# Patient Record
Sex: FEMALE | Race: WHITE | NOT HISPANIC OR LATINO | Employment: FULL TIME | URBAN - METROPOLITAN AREA
[De-identification: names, ages, dates, MRNs, and addresses within clinical notes are randomized per-mention and may not be internally consistent; named-entity substitution may affect disease eponyms.]

---

## 2017-05-23 ENCOUNTER — ANESTHESIA - HEALTHEAST (OUTPATIENT)
Dept: INTENSIVE CARE | Facility: HOSPITAL | Age: 42
End: 2017-05-23

## 2017-06-09 ENCOUNTER — OFFICE VISIT - HEALTHEAST (OUTPATIENT)
Dept: CARDIOLOGY | Facility: CLINIC | Age: 42
End: 2017-06-09

## 2017-06-09 DIAGNOSIS — G47.33 OSA ON CPAP: ICD-10-CM

## 2017-06-09 DIAGNOSIS — I48.0 PAROXYSMAL ATRIAL FIBRILLATION (H): ICD-10-CM

## 2017-06-09 ASSESSMENT — MIFFLIN-ST. JEOR: SCORE: 2116.46

## 2017-08-16 ENCOUNTER — OFFICE VISIT - HEALTHEAST (OUTPATIENT)
Dept: CARDIOLOGY | Facility: CLINIC | Age: 42
End: 2017-08-16

## 2017-08-16 DIAGNOSIS — I48.0 PAROXYSMAL ATRIAL FIBRILLATION (H): ICD-10-CM

## 2017-08-16 DIAGNOSIS — I10 ESSENTIAL HYPERTENSION WITH GOAL BLOOD PRESSURE LESS THAN 130/80: ICD-10-CM

## 2017-08-16 DIAGNOSIS — G47.33 OSA ON CPAP: ICD-10-CM

## 2017-08-16 ASSESSMENT — MIFFLIN-ST. JEOR: SCORE: 2138.12

## 2017-12-05 ENCOUNTER — COMMUNICATION - HEALTHEAST (OUTPATIENT)
Dept: SLEEP MEDICINE | Facility: CLINIC | Age: 42
End: 2017-12-05

## 2017-12-13 ENCOUNTER — OFFICE VISIT - HEALTHEAST (OUTPATIENT)
Dept: SLEEP MEDICINE | Facility: CLINIC | Age: 42
End: 2017-12-13

## 2017-12-13 DIAGNOSIS — G47.33 OSA ON CPAP: ICD-10-CM

## 2017-12-13 ASSESSMENT — MIFFLIN-ST. JEOR: SCORE: 2135.17

## 2017-12-14 ENCOUNTER — AMBULATORY - HEALTHEAST (OUTPATIENT)
Dept: SLEEP MEDICINE | Facility: CLINIC | Age: 42
End: 2017-12-14

## 2017-12-27 ENCOUNTER — AMBULATORY - HEALTHEAST (OUTPATIENT)
Dept: SLEEP MEDICINE | Facility: CLINIC | Age: 42
End: 2017-12-27

## 2018-08-08 ENCOUNTER — COMMUNICATION - HEALTHEAST (OUTPATIENT)
Dept: SLEEP MEDICINE | Facility: CLINIC | Age: 43
End: 2018-08-08

## 2019-01-24 ENCOUNTER — OFFICE VISIT - HEALTHEAST (OUTPATIENT)
Dept: FAMILY MEDICINE | Facility: CLINIC | Age: 44
End: 2019-01-24

## 2019-01-24 DIAGNOSIS — I48.0 PAROXYSMAL ATRIAL FIBRILLATION (H): ICD-10-CM

## 2019-01-28 ENCOUNTER — COMMUNICATION - HEALTHEAST (OUTPATIENT)
Dept: NURSING | Facility: CLINIC | Age: 44
End: 2019-01-28

## 2021-05-24 ENCOUNTER — RECORDS - HEALTHEAST (OUTPATIENT)
Dept: ADMINISTRATIVE | Facility: CLINIC | Age: 46
End: 2021-05-24

## 2021-05-25 ENCOUNTER — RECORDS - HEALTHEAST (OUTPATIENT)
Dept: ADMINISTRATIVE | Facility: CLINIC | Age: 46
End: 2021-05-25

## 2021-05-26 ENCOUNTER — RECORDS - HEALTHEAST (OUTPATIENT)
Dept: ADMINISTRATIVE | Facility: CLINIC | Age: 46
End: 2021-05-26

## 2021-05-27 ENCOUNTER — RECORDS - HEALTHEAST (OUTPATIENT)
Dept: ADMINISTRATIVE | Facility: CLINIC | Age: 46
End: 2021-05-27

## 2021-05-28 ENCOUNTER — RECORDS - HEALTHEAST (OUTPATIENT)
Dept: ADMINISTRATIVE | Facility: CLINIC | Age: 46
End: 2021-05-28

## 2021-05-29 ENCOUNTER — RECORDS - HEALTHEAST (OUTPATIENT)
Dept: ADMINISTRATIVE | Facility: CLINIC | Age: 46
End: 2021-05-29

## 2021-05-30 ENCOUNTER — RECORDS - HEALTHEAST (OUTPATIENT)
Dept: ADMINISTRATIVE | Facility: CLINIC | Age: 46
End: 2021-05-30

## 2021-05-31 VITALS — BODY MASS INDEX: 43.4 KG/M2 | HEIGHT: 69 IN | WEIGHT: 293 LBS

## 2021-05-31 VITALS — WEIGHT: 293 LBS | BODY MASS INDEX: 43.4 KG/M2 | HEIGHT: 69 IN

## 2021-05-31 VITALS — WEIGHT: 293 LBS | HEIGHT: 68 IN | BODY MASS INDEX: 44.41 KG/M2

## 2021-06-02 ENCOUNTER — RECORDS - HEALTHEAST (OUTPATIENT)
Dept: ADMINISTRATIVE | Facility: CLINIC | Age: 46
End: 2021-06-02

## 2021-06-02 VITALS — WEIGHT: 293 LBS | BODY MASS INDEX: 45.48 KG/M2

## 2021-06-10 NOTE — ANESTHESIA PREPROCEDURE EVALUATION
Anesthesia Evaluation      Patient summary reviewed   No history of anesthetic complications     Airway   Mallampati: II  Neck ROM: full   Pulmonary - normal exam   (+) sleep apnea,                          Cardiovascular   (+) dysrhythmias (Paroxysmal A-fib s/p several cardioversions), ,     ECG reviewed  Rhythm: irregular        Neuro/Psych    (+) depression, anxiety/panic attacks,     Endo/Other    (+) obesity (Morbid obesity),      GI/Hepatic/Renal - negative ROS      Other findings: 41 y.o. female PMH of paroxysmal atrial fibrillation, first paroxysm 2011, requiring cardioversion done, as well as on 4 other occasions, morbid obesity, depression and anxiety-noncompliant with her Wellbutrin and Prozac due to forgetting taking medications, transferred from  ER for:      Dental    (+) chipped                       Anesthesia Plan  Planned anesthetic: general mask and total IV anesthesia    ASA 3   Induction: intravenous   Anesthetic plan and risks discussed with: patient    Post-op plan: routine recovery

## 2021-06-10 NOTE — ANESTHESIA CARE TRANSFER NOTE
Last vitals: There were no vitals filed for this visit.  Patient's level of consciousness is drowsy  Spontaneous respirations: yes  Maintains airway independently: yes  Dentition unchanged: yes  Oropharynx: oropharynx clear of all foreign objects    QCDR Measures:  ASA# 20 - Surgical No Data Recorded  PQRS# 430 - Adult PONV Prevention: NA - Not adult patient, not GA or 3 or more risk factors NOT present  ASA# 8 - Peds PONV Prevention: NA - Not pediatric patient, not GA or 2 or more risk factors NOT present  PQRS# 424 - Judi-op Temp Management: [id number] - Body temp not recorded within timeframe  PQRS# 426 - PACU Transfer Protocol: - Transfer of care checklist used  ASA# 14 - Acute Post-op Pain: ASA14B - Patient did NOT experience pain >= 7 out of 10

## 2021-06-10 NOTE — ANESTHESIA POSTPROCEDURE EVALUATION
Patient: Milagro Thomas  * No procedures listed *  Anesthesia type: general    Patient location: [facility] Surgical Floor  Last vitals: There were no vitals filed for this visit.  Post vital signs: stable  Level of consciousness: awake and responds to simple questions  Post-anesthesia pain: pain controlled  Post-anesthesia nausea and vomiting: no  Pulmonary: unassisted, return to baseline  Cardiovascular: stable and blood pressure at baseline  Hydration: adequate  Anesthetic events: no    QCDR Measures:  ASA# 11 - Judi-op Cardiac Arrest: ASA11B - Patient did NOT experience unanticipated cardiac arrest  ASA# 12 - Judi-op Mortality Rate: ASA12B - Patient did NOT die  ASA# 13 - PACU Re-Intubation Rate: NA - No ETT / LMA used for case  ASA# 10 - Composite Anes Safety: ASA10A - No serious adverse event  ASA# 38 - New Corneal Injury: ASA38A - No new exposure keratitis or corneal abrasion in PACU    Additional Notes:  Patient was instructed to use her CPAP whenever sleeping at home post-cardioversion. That patient stated she will use her CPAP.    Bruno Marie MD

## 2021-06-11 NOTE — PROGRESS NOTES
[facility]   Arrhythmia Clinic    Assessment/Plan:  Diagnoses and all orders for this visit:    Paroxysmal atrial fibrillation first diagnosed in 2011 and having reoccurrence at least 6 months or more between episodes.  Had 4 cardioversions in the past.  She was awake with 1 of her cardioversions and is coming to clinic to find out what she could do to prevent having A. fib anymore.  She complains of swelling on diltiazem and fatigue on even low dose of beta-blocker of 25 mg on both atenolol and metoprolol.  I discussed since she has infrequent episodes she would need to do a nuclear stress test but could use flecainide as pill in the pocket if there is a delay in her going forward with ablation.  I discussed ablation as well.  I discussed normal anatomy and physiology of heart in sinus rhythm versus atrial fibrillation.  Discussed natural progression of atrial fibrillation but high individual variability and how this relates to success of PVI as well.  Discussed  pulmonary vein procedure in detail and answered multiple questions.  Discussed waiting list, purpose to get off antiarrhythmics but not necessarily anticoagulation if on it for high risk or other reasons and 30% chance of repeat procedure.  Discussed risk of procedure as but not limited to the following and <1% risk of each:  cardiac perforation, (tamponade),  embolic events like CVA or MI   death  phrenic nerve paralysis    I discussed need for  anticoagulation one month before in 3 months after.  She has IUD and still menstruating but later because of IUD.  Discussed her flow may be heavier during the time.  When she is on anticoagulation.  Would recommend Eliquis 5 mg 1 tab p.o. every 12 hours when anticoagulation needed.      After discussion, Milagro Thomas wants to meet with Dr. Warren and will bring her  to appointment as she is wanting ablation versus going on antiarrhythmic pill in the pocket.  Will try to set this up in 6-8  weeks.  [id number] score of 1 and recommended that she start on aspirin 325 mg 1 tab p.o. daily.    -     aspirin (ODESSA ASPIRIN) 325 MG tablet; Take 1 tablet (325 mg total) by mouth daily.; Refill: 0    CALLY on CPAP and cannot sleep without CPAP so she uses it consistently and not an issue for her.    Other orders  -     atenolol (TENORMIN) 25 MG tablet; Take 12.5-25 mg by mouth.      40 minutes were spent in face-to-face counseling regarding above diagnoses and options for treatment.      ______________________________________________________________________    Subjective:    I had the opportunity to see Milagro Thomas at the [facility]. Milagro Thomas is a 41 y.o. female and following up in clinic after being hospitalized with paroxysmal atrial fib and had afib with RVR of 110s-160s despite diltiazem drip.  She tells me that this last episode of atrial fib was more significant than it usually is for her regarding symptoms.  She had chest pain and doesn't usually have this.    She normally knows exactly when she goes out of rhythm because she feels it in her chest and is lightheaded.  She does not tolerate a lot of activity the longer she is in A. fib.  She was first diagnosed with atrial fib in 2011 and has had 4 cardioversions due to significant symptoms with A. fib episodes.  Her parents both have atrial fib and her father is in persistent A. fib all the time now.  She quit smoking 5 years ago.  She denies any history of hypertension.  She tells me that she is attempting to lose weight.  She denies any other significant medical history.  ______________________________________________________________________    Problem List:  Patient Active Problem List   Diagnosis     Morbid obesity with BMI of 40.0-44.9, adult     Major depressive disorder     Nonspecific chest pain     Anxiety disorder     Paroxysmal atrial fibrillation     CALLY on CPAP     Medical History:  Past Medical History:    Diagnosis Date     Anxiety      Eczema 2/10/2015     Morbid obesity with BMI of 40.0-44.9, adult      Obstructive sleep apnea syndrome 2013    Overview:  CPAP at 7 cm     Persistent atrial fibrillation 2011    S/P electrical cardioversion; had normal stress echo at [place] in . Four more cardioversions in ER since per patient. Did not tolerate metoprolol.     Surgical History:  Past Surgical History:   Procedure Laterality Date      SECTION, CLASSIC      x2     FOOT SURGERY      bone spur and reconstruction     MI ERCP W/BIOPSY SINGLE/MULTIPLE       Social History:  Social History   Substance Use Topics     Smoking status: Former Smoker     Types: Cigarettes     Smokeless tobacco: None     Alcohol use No        Review of Systems: Review of Systems:   General: WNL  Eyes: WNL  Ears/Nose/Throat: WNL  Lungs: WNL  Heart: WNL  Stomach: WNL  Bladder: WNL  Muscle/Joints: WNL  Skin: WNL  Nervous System: WNL  Mental Health: WNL     Blood: WNL      Family History:  Family History   Problem Relation Age of Onset     Diabetes Father      CABG Father 50     Atrial fibrillation Father      Sleep apnea Father      Skin cancer Father      Diabetes Maternal Aunt      No Medical Problems Mother      No Medical Problems Son          Allergies:  Allergies   Allergen Reactions     Morphine (Pf)      Heart rate slows and resp slow     Prednisone Palpitations     Medications:  Current Outpatient Prescriptions   Medication Sig Dispense Refill     buPROPion (WELLBUTRIN XL) 300 MG 24 hr tablet Take 300 mg by mouth daily.       clobetasol (TEMOVATE) 0.05 % cream Apply 1 application topically 2 (two) times a day as needed (eczema).        FLUoxetine (PROZAC) 40 MG capsule Take 40 mg by mouth daily.        hydrocortisone 1 % cream Apply 1 application topically 2 (two) times a day as needed (eczema).        ibuprofen (ADVIL,MOTRIN) 200 MG tablet Take 600 mg by mouth every 6 (six) hours as needed for pain.        "levonorgestrel (MIRENA) 20 mcg/24 hr (5 years) IUD 1 Device by Intrauterine route.       MELATONIN ORAL Take 6 mg by mouth at bedtime as needed. (2 x 3 mg chewable gummy = 6 mg dose)       naproxen (NAPROSYN) 250 MG tablet Take 250 mg by mouth 2 (two) times a day as needed.       aspirin (ODESSA ASPIRIN) 325 MG tablet Take 1 tablet (325 mg total) by mouth daily.  0     atenolol (TENORMIN) 25 MG tablet Take 12.5-25 mg by mouth.       diltiazem (CARDIZEM CD) 120 MG 24 hr capsule Take 1 capsule (120 mg total) by mouth daily. 30 capsule 0     No current facility-administered medications for this visit.      Facility-Administered Medications Ordered in Other Visits   Medication Dose Route Frequency Provider Last Rate Last Dose     naloxone injection 0.2-0.4 mg (NARCAN)  0.2-0.4 mg Intravenous PRN Bruno Marie MD        Or     naloxone injection 0.2-0.4 mg (NARCAN)  0.2-0.4 mg Intramuscular PRN Bruno Marie MD           Objective:   Vital signs:  /86 (Patient Site: Right Arm, Patient Position: Sitting, Cuff Size: Adult Large)  Pulse 80  Resp 18  Ht 5' 9\" (1.753 m)  Wt (!) 308 lb (139.7 kg)  SpO2 98%  BMI 45.48 kg/m2      Physical Exam:    GENERAL APPEARANCE: Alert, cooperative and in no acute distress.  HEENT: No scleral icterus. No Xanthelasma. Oral mucuos membranes pink and moist.  NECK: JVP Nl.   CHEST: clear to auscultation  CARDIOVASCULAR: S1, S2 without murmur ,clicks or rubs. Regular, regular.  Radial and posterior tibial pulses are intact and symetric. EXTREMITIES: No cyanosis, clubbing or edema.    Results personally reviewed:  Results for orders placed during the hospital encounter of 05/23/17   Echo Complete [ECH10] 05/23/2017    Narrative   Technically challenging study. Definity contrast utilized    Atrial fibrillation suggested with controlled ventricular response    Left ventricle ejection fraction is mildly, globally decreased. Left   ventricular ejection fraction estimated 45-50%. " Suboptimal endocardial   resolution. Cannot exclude more prominent lateral wall hypokinesis on this   examination    Moderate left ventricular hypertrophy suggested    The right ventricle and right atrium are not well visualized on this   examination    Left Atrium: Left atrial volume is mildly increased    No obvious significant valve abnormality    Consider additional modalities to better define left and right   ventricular systolic function          TSH:   Lab Results   Component Value Date    TSH 5.15 (H) 05/23/2017     BNP:   Lab Results   Component Value Date     (H) 05/23/2017     BMP:  Lab Results   Component Value Date    CREATININE 0.69 05/23/2017    BUN 8 05/23/2017     05/23/2017    K 3.8 05/23/2017     05/23/2017    CO2 25 05/23/2017       This note has been dictated using voice recognition software. Any grammatical or context distortions are unintentional and inherent to the software.    NAILA SEALS RN, [facility]  [phone]

## 2021-06-12 NOTE — PROGRESS NOTES
[facility]  Arrhythmia Clinic  Derejert HIPOLITO Warren    Referring:      Assessment:         Paroxysmal atrial fibrillation: The patient has a several year history of paroxysmal atrial fibrillation.  She has also required cardioversion on several occasions.  The patient has been on calcium channel blocker and beta-blocker and does not want to go on chronic medical therapy for her atrial fibrillation.  The patient is a candidate for early intervention of her atrial fibrillation and effort to decrease the likelihood of further recurrence in the future.  She does have risk factors including sleep apnea which is treated now and obesity which would increase the likelihood of recurrent A. fib as time goes by.  The risks benefits and expected outcomes for mapping and ablation of her pulmonary veins and additional non-PV triggers was discussed with the patient and her  in detail.  The patient will consider that information and render decision about moving forward in the next few days.    Hypertension: The patient's blood pressure is elevated today but apparently this is somewhat intermittent.  Weight loss and exercise would likely be of significant benefit for this patient.  In the meantime if she continues to have elevated blood pressure she will need antihypertensive medical therapy.    Obstructive sleep apnea: The patient is compliant with therapy.      Recommendations:    The patient will continue on her current medical regimen.    The patient will be contacted in the next few days as to whether or not she wishes to move forward with atrial fibrillation ablation/pulmonary vein isolation.  If the patient agrees to proceed then she would undergo routine preprocedural screening including CT scan of the chest to detail her left atrial and pulmonary vein anatomy.  She is also undergo LATRELL to rule out thrombus.    If the patient decides to proceed she will need to be placed on oral anticoagulant therapy  preprocedure per protocol.      Patient Active Problem List   Diagnosis     Morbid obesity with BMI of 40.0-44.9, adult     Major depressive disorder     Nonspecific chest pain     Anxiety disorder     Paroxysmal atrial fibrillation     CALLY on CPAP     Essential hypertension       Subjective:  Milagro Thomas (41 y.o. female) returns to the arrhythmia clinic for discussion of treatment options for her recurrent symptomatic atrial fibrillation.  The patient's atrial fibrillation dates back several years possibly to as early as 2005.  The patient's symptoms include palpitations, diaphoresis, lightheadedness and some mild shortness of breath on occasion.  The patient notes that she has had at least 5 cardioversions in the past as she is not on oral anticoagulant therapy and wishes to terminate her episodes in less than 24 hours.  The patient did have a bad experience with 1 of her cardioversions being fairly awake and being quite aware of the shock.  She reports no exertional dyspnea or chest pain.  She is not describing any orthopnea, PND, or ankle edema.    Current Outpatient Prescriptions   Medication Sig Dispense Refill     aspirin (ODESSA ASPIRIN) 325 MG tablet Take 1 tablet (325 mg total) by mouth daily.  0     atenolol (TENORMIN) 25 MG tablet Take 12.5-25 mg by mouth.       buPROPion (WELLBUTRIN XL) 300 MG 24 hr tablet Take 300 mg by mouth daily.       clobetasol (TEMOVATE) 0.05 % cream Apply 1 application topically 2 (two) times a day as needed (eczema).        diltiazem (CARDIZEM CD) 120 MG 24 hr capsule Take 1 capsule (120 mg total) by mouth daily. 30 capsule 0     FLUoxetine (PROZAC) 40 MG capsule Take 40 mg by mouth daily.        hydrocortisone 1 % cream Apply 1 application topically 2 (two) times a day as needed (eczema).        ibuprofen (ADVIL,MOTRIN) 200 MG tablet Take 600 mg by mouth every 6 (six) hours as needed for pain.       levonorgestrel (MIRENA) 20 mcg/24 hr (5 years) IUD 1 Device by Intrauterine  "route.       MELATONIN ORAL Take 6 mg by mouth at bedtime as needed. (2 x 3 mg chewable gummy = 6 mg dose)       naproxen (NAPROSYN) 250 MG tablet Take 250 mg by mouth 2 (two) times a day as needed.       No current facility-administered medications for this visit.      Facility-Administered Medications Ordered in Other Visits   Medication Dose Route Frequency Provider Last Rate Last Dose     naloxone injection 0.2-0.4 mg (NARCAN)  0.2-0.4 mg Intravenous PRN Bruno Marie MD        Or     naloxone injection 0.2-0.4 mg (NARCAN)  0.2-0.4 mg Intramuscular PRN Bruno Marie MD           Review of Systems:   General: WNL  Eyes: WNL  Ears/Nose/Throat: WNL  Lungs: WNL  Heart: WNL  Stomach: WNL  Bladder: WNL  Muscle/Joints: WNL  Skin: WNL  Nervous System: WNL  Mental Health: WNL     Blood: WNL    Family History  Family History   Problem Relation Age of Onset     Diabetes Father      CABG Father 50     Atrial fibrillation Father      Sleep apnea Father      Skin cancer Father      Diabetes Maternal Aunt      No Medical Problems Mother      No Medical Problems Son        Social History   reports that she has quit smoking. Her smoking use included Cigarettes. She has never used smokeless tobacco. She reports that she does not drink alcohol or use illicit drugs.    Objective:   Vital signs in last 24 hours:  BP (!) 138/92 (Patient Site: Right Arm, Patient Position: Sitting, Cuff Size: Adult Large)  Pulse 72  Resp 16  Ht 5' 8.25\" (1.734 m)  Wt (!) 315 lb 6.4 oz (143.1 kg)  BMI 47.61 kg/m2  Weight: Weight: (!) 315 lb 6.4 oz (143.1 kg)     Physical Exam:  General: The patient is alert oriented to person place and situation.  The patient is in no acute distress at the time of my evaluation.  Eyes: Pupils are equal, round, and reactive to light.  Conjunctiva and sclera are clear.  ENT: Oral mucosa is moist and without redness. No evident nasal discharge.  Pulmonary: Lungs are clear bilaterally with no rales, rhonchi, or " wheezes.    Cardiovascular exam: Rhythm is regular. S1 and S2 are normal. No significant murmur is present. JVP is normal. Lower extremities demonstrate no significant edema. Distal pulses are intact bilaterally.  Abdomen is obese, soft, and nontender.  Musculoskeletal: Spine is straight. Extremities without deformity.  Neuro: Gait is normal.   Skin is warm, dry, and otherwise intact.      Cardiographics:   Echo May 23, 2017  Indications   Atrial fibrillation   Summary     Technically challenging study. Definity contrast utilized    Atrial fibrillation suggested with controlled ventricular response    Left ventricle ejection fraction is mildly, globally decreased. Left ventricular ejection fraction estimated 45-50%. Suboptimal endocardial resolution. Cannot exclude more prominent lateral wall hypokinesis on this examination    Moderate left ventricular hypertrophy suggested    The right ventricle and right atrium are not well visualized on this examination    Left Atrium: Left atrial volume is mildly increased    No obvious significant valve abnormality    Consider additional modalities to better define left and right ventricular systolic function     Twelve-lead EKG dated May 22, 2070 is reviewed.  The study demonstrates atrial fibrillation with rapid ventricular response.    Lab Results:   Lab Results   Component Value Date     05/23/2017    K 3.8 05/23/2017     05/23/2017    CO2 25 05/23/2017    BUN 8 05/23/2017    CREATININE 0.69 05/23/2017    CALCIUM 9.0 05/23/2017     Lab Results   Component Value Date    WBC 10.2 05/23/2017    WBC 8.0 07/09/2015    HGB 12.0 05/23/2017    HCT 36.3 05/23/2017    MCV 83 05/23/2017     05/23/2017     Lab Results   Component Value Date    INR 1.10 05/22/2017         Outside record review:

## 2021-06-14 NOTE — PROGRESS NOTES
Order for Durable Medical Equipment was processed and equipment ordered.   DME provider: [facility]  Date Faxed: 12/14/17  Ordering Provider:   Equipment ordered: Cpap supply renewal

## 2021-06-14 NOTE — PROGRESS NOTES
Dear  Misha Stevenson Md  [address] [place] [address]    Thank you for the opportunity to participate in the care of  Milagro Thomas.    She is a 42 y.o. y/o who comes to the clinic for consultation.     She was diagnosed with obstructive sleep apnea in  and was prescribed CPAP therapy with a pressure of 7 cwp. She has been using CPAP with the same pressure since her initial sleep study but she was not aware that she was supposed to get new supplies and never went back to her DME. She needs new parts.      Current DME provider: none  Current device: S9 ResMed  Current settings:7 cwp  Current mask: nasal mask  Last time supplies were received: more than 4 years ago    Current AHI: 0.7  Current compliance: 100% of days > 4 hours.      Past Medical History  Past Medical History:   Diagnosis Date     Anxiety      Eczema 2/10/2015     Essential hypertension 2017    Intermittant     Morbid obesity with BMI of 40.0-44.9, adult      Obstructive sleep apnea syndrome 2013    Overview:  CPAP at 7 cm     Paroxysmal atrial fibrillation 2011    CV [facility]  (normal stress echo) CV X 4 in ER Rx metoprolol not tolerated [id number] score = 2 (female/ HTN) Rx aspirin     Persistent atrial fibrillation 2011    S/P electrical cardioversion; had normal stress echo at [facility] in . Four more cardioversions in ER since per patient. Did not tolerate metoprolol.        Past Surgical History  Past Surgical History:   Procedure Laterality Date      SECTION, CLASSIC      x2     FOOT SURGERY      bone spur and reconstruction     AL ERCP W/BIOPSY SINGLE/MULTIPLE          Meds  Current Outpatient Prescriptions   Medication Sig Dispense Refill     clindamycin (CLEOCIN T) 1 % lotion Apply topically.       aspirin (ODESSA ASPIRIN) 325 MG tablet Take 1 tablet (325 mg total) by mouth daily.  0     atenolol (TENORMIN) 25 MG tablet Take 12.5-25 mg by mouth.       clobetasol (TEMOVATE) 0.05 % cream Apply  1 application topically 2 (two) times a day as needed (eczema).        FLUoxetine (PROZAC) 40 MG capsule Take 40 mg by mouth daily.        hydrocortisone 1 % cream Apply 1 application topically 2 (two) times a day as needed (eczema).        levonorgestrel (MIRENA) 20 mcg/24 hr (5 years) IUD 1 Device by Intrauterine route.       MELATONIN ORAL Take 6 mg by mouth at bedtime as needed. (2 x 3 mg chewable gummy = 6 mg dose)       No current facility-administered medications for this visit.      Facility-Administered Medications Ordered in Other Visits   Medication Dose Route Frequency Provider Last Rate Last Dose     naloxone injection 0.2-0.4 mg (NARCAN)  0.2-0.4 mg Intravenous PRN Bruno Marie MD        Or     naloxone injection 0.2-0.4 mg (NARCAN)  0.2-0.4 mg Intramuscular PRN Bruno Marei MD            Allergies  Morphine (pf) and Prednisone     Social History  Social History     Social History     Marital status:      Spouse name: Zafar     Number of children: 2     Years of education: N/A     Occupational History     security [employer]      Social History Main Topics     Smoking status: Former Smoker     Types: Cigarettes     Smokeless tobacco: Never Used     Alcohol use No     Drug use: No     Sexual activity: Yes     Partners: Male     Other Topics Concern     Not on file     Social History Narrative        Family History  Family History   Problem Relation Age of Onset     Diabetes Father      CABG Father 50     Atrial fibrillation Father      Sleep apnea Father      Skin cancer Father      Diabetes Maternal Aunt      No Medical Problems Mother      No Medical Problems Son            Review of Systems:  Constitutional: Negative except as noted in HPI.   Eyes: Negative except as noted in HPI.   ENT: Negative except as noted in HPI.   Cardiovascular: Negative except as noted in HPI.   Respiratory: Negative except as noted in HPI.   Gastrointestinal: Negative except as noted in HPI.  "  Genitourinary: Negative except as noted in HPI.   Musculoskeletal: Negative except as noted in HPI.   Integumentary: Negative except as noted in HPI.   Neurological: Negative except as noted in HPI.   Psychiatric: Negative except as noted in HPI.   Endocrine: Negative except as noted in HPI.   Hematologic/Lymphatic: Negative except as noted in HPI.      Physical Exam:  /86  Pulse 65  Ht 5' 8.75\" (1.746 m)  Wt (!) 313 lb (142 kg)  SpO2 96%  BMI 46.56 kg/m2  BMI:Body mass index is 46.56 kg/(m^2).   GEN: NAD, obese  Neurological: Alert, oriented to time, place, and person.  Psych:  normal mood, normal affect     Labs/Studies:     Lab Results   Component Value Date    WBC 10.2 05/23/2017    HGB 12.0 05/23/2017    HCT 36.3 05/23/2017    MCV 83 05/23/2017     05/23/2017         Chemistry        Component Value Date/Time     05/23/2017 0336    K 3.8 05/23/2017 0336     05/23/2017 0336    CO2 25 05/23/2017 0336    BUN 8 05/23/2017 0336    CREATININE 0.69 05/23/2017 0336     (H) 05/23/2017 0336        Component Value Date/Time    CALCIUM 9.0 05/23/2017 0336    ALKPHOS 94 05/23/2017 0336    AST 11 05/23/2017 0336    ALT 11 05/23/2017 0336    BILITOT 0.4 05/23/2017 0336            No results found for: FERRITIN  Lab Results   Component Value Date    TSH 5.15 (H) 05/23/2017     Lab Results   Component Value Date    HGBA1C 6.4 (H) 05/23/2017        Assessment and Plan:  In summary Milagro Thomas is a 42 y.o. year old female here for consultation.    1. Obstructive sleep apnea.  Patient continues to use her device and benefits from it  Excellent residual AHI  I will write a new prescription for supplies.     Patient verbalized understanding of these issues, agrees with the plan and all questions were answered today. Patient was given an opportuntity to voice any other symptoms or concerns not listed above. Patient did not have any other symptoms or concerns.      Patient told to return in " 12 months or as needed.     MD MIKE Andrade Board Certified in Internal Medicine and Sleep Medicine  [facility].    We spent a total of 45 minutes of face-to-face encounter and more than 50% of the encounter was used for counseling or coordination of care.

## 2021-06-15 PROBLEM — F32.9 MAJOR DEPRESSIVE DISORDER: Chronic | Status: ACTIVE | Noted: 2017-05-23

## 2021-06-15 PROBLEM — R07.9 NONSPECIFIC CHEST PAIN: Status: ACTIVE | Noted: 2017-05-22

## 2021-06-15 NOTE — PROGRESS NOTES
Patient was seen today for a mask fitting, she tried on the lowry FX for her pillow mask and airfit P10 for her pillow mask. Patient chose the lowry fx for her and received new tubing and filters.

## 2021-06-16 PROBLEM — I48.0 PAROXYSMAL ATRIAL FIBRILLATION WITH RAPID VENTRICULAR RESPONSE (H): Status: ACTIVE | Noted: 2019-02-09

## 2021-06-16 PROBLEM — I51.9 LV DYSFUNCTION: Status: ACTIVE | Noted: 2018-05-07

## 2021-06-16 PROBLEM — I48.91 ATRIAL FIBRILLATION WITH RAPID VENTRICULAR RESPONSE (H): Status: ACTIVE | Noted: 2018-05-07

## 2021-06-16 PROBLEM — I48.91 ATRIAL FIBRILLATION (H): Status: ACTIVE | Noted: 2018-05-07

## 2021-06-16 PROBLEM — I10 ESSENTIAL HYPERTENSION, BENIGN: Status: ACTIVE | Noted: 2017-08-16

## 2021-06-23 NOTE — PROGRESS NOTES
Called Patient to to talk about Clinic Care Coordination to help with Financial Assistance  And ask if the patient will establish care with a PCP with [facility].       Aline Light   [phone]  Clinic Care Coordinator

## 2021-06-23 NOTE — PROGRESS NOTES
Assessment/Plan:     1. Paroxysmal atrial fibrillation (H)  Patient doing well post-hospitalization.  Fatigue likely secondary to beta-blocker.  She will continue the Eliquis and sotalol twice daily as prescribed.  Referral placed for the atrial fibrillation clinic, and patient met with our referral coordinator to get this scheduled.  To ER with any tachycardia, chest pain, or shortness of breath.  She will continue following with her regular PCP for her other chronic conditions.  - Ambulatory referral to Afib Clinic        Subjective:     Milagro Thomas is a 43 y.o. female accompanied by her  who presents for follow up regarding recent hospitalization.  Patient's PCP is Misha Stevenson at [facility].  She plans on continuing to see him for primary care.  Patient has a history of paroxysmal atrial fibrillation.  She previously followed with cardiology at [facility].  Patient presented to the emergency department on 1/18 with symptoms of a fast and irregular heartbeat.  She had some chest pressure, but no acute pain.  No shortness of breath.  Patient has experienced this before, and knew she was in atrial fibrillation.  She did try some vasovagal exercises at home prior to the coming to the ED.  Cardioversion was attempted in the ER, but was unsuccessful.  Patient was admitted for IV medication.  She eventually did convert back to normal sinus rhythm.  Patient was placed back on sotalol twice daily and started on Eliquis.  She was instructed to follow-up with atrial fibrillation clinic in 2 weeks.    She is feeling well today.  She is feeling a little bit fatigued since the hospitalization.  When she was previously on sotalol, it also made her feel fatigued.  She is otherwise tolerating the medications well.  Denies any tachycardia, chest pain, or shortness of breath.  She uses a CPAP at night, and is very compliant with this.  Patient has newly been diagnosed with diabetes and was recently started on metformin.  She  is doing diabetic education over at [facility], and has successfully lost some weight already.      The following portions of the patient's history were reviewed and updated as appropriate: allergies, current medications.    Review of Systems  A comprehensive review of systems was performed and was otherwise negative    Objective:     /80 (Patient Site: Right Arm, Patient Position: Sitting, Cuff Size: Adult Large)   Pulse 60   Temp 97.9  F (36.6  C) (Oral)   Wt (!) 308 lb (139.7 kg)   BMI 45.48 kg/m      General Appearance: Alert, cooperative, no distress, appears stated age  Neck: Supple, symmetrical, trachea midline, no adenopathy; no carotid bruit or JVD  Lungs: Clear to auscultation bilaterally, respirations unlabored  Heart: Regular rate and rhythm, S1 and S2 normal, no murmur, rub, or gallop     Chanell Wagstrom, NP

## 2021-08-03 PROBLEM — I48.91 RAPID ATRIAL FIBRILLATION (H): Status: RESOLVED | Noted: 2017-05-23 | Resolved: 2017-05-23

## 2022-04-09 ENCOUNTER — HOSPITAL ENCOUNTER (INPATIENT)
Facility: CLINIC | Age: 47
LOS: 1 days | Discharge: HOME | DRG: 309 | End: 2022-04-10
Attending: EMERGENCY MEDICINE | Admitting: FAMILY MEDICINE
Payer: COMMERCIAL

## 2022-04-09 DIAGNOSIS — I48.0 PAROXYSMAL ATRIAL FIBRILLATION WITH RAPID VENTRICULAR RESPONSE (H): Primary | ICD-10-CM

## 2022-04-09 DIAGNOSIS — I48.91 ATRIAL FIBRILLATION WITH RVR (H): ICD-10-CM

## 2022-04-09 LAB
ANION GAP SERPL CALCULATED.3IONS-SCNC: 15 MMOL/L (ref 5–18)
ATRIAL RATE - MUSE: 136 BPM
BASOPHILS # BLD AUTO: 0 10E3/UL (ref 0–0.2)
BASOPHILS NFR BLD AUTO: 0 %
BUN SERPL-MCNC: 13 MG/DL (ref 8–22)
CALCIUM SERPL-MCNC: 10.3 MG/DL (ref 8.5–10.5)
CHLORIDE BLD-SCNC: 104 MMOL/L (ref 98–107)
CO2 SERPL-SCNC: 23 MMOL/L (ref 22–31)
CREAT SERPL-MCNC: 0.8 MG/DL (ref 0.6–1.1)
DIASTOLIC BLOOD PRESSURE - MUSE: 58 MMHG
EOSINOPHIL # BLD AUTO: 0.2 10E3/UL (ref 0–0.7)
EOSINOPHIL NFR BLD AUTO: 1 %
ERYTHROCYTE [DISTWIDTH] IN BLOOD BY AUTOMATED COUNT: 14 % (ref 10–15)
GFR SERPL CREATININE-BSD FRML MDRD: >90 ML/MIN/1.73M2
GLUCOSE BLD-MCNC: 157 MG/DL (ref 70–125)
GLUCOSE BLDC GLUCOMTR-MCNC: 103 MG/DL (ref 70–99)
GLUCOSE BLDC GLUCOMTR-MCNC: 106 MG/DL (ref 70–99)
GLUCOSE BLDC GLUCOMTR-MCNC: 119 MG/DL (ref 70–99)
HBA1C MFR BLD: 6.5 %
HCT VFR BLD AUTO: 38.4 % (ref 35–47)
HGB BLD-MCNC: 12.1 G/DL (ref 11.7–15.7)
IMM GRANULOCYTES # BLD: 0 10E3/UL
IMM GRANULOCYTES NFR BLD: 0 %
INR PPP: 1.72 (ref 0.85–1.15)
INTERPRETATION ECG - MUSE: NORMAL
LACTATE SERPL-SCNC: 1.1 MMOL/L (ref 0.7–2)
LYMPHOCYTES # BLD AUTO: 4.7 10E3/UL (ref 0.8–5.3)
LYMPHOCYTES NFR BLD AUTO: 42 %
MAGNESIUM SERPL-MCNC: 1.6 MG/DL (ref 1.8–2.6)
MAGNESIUM SERPL-MCNC: 1.7 MG/DL (ref 1.8–2.6)
MAGNESIUM SERPL-MCNC: 1.9 MG/DL (ref 1.8–2.6)
MCH RBC QN AUTO: 26.2 PG (ref 26.5–33)
MCHC RBC AUTO-ENTMCNC: 31.5 G/DL (ref 31.5–36.5)
MCV RBC AUTO: 83 FL (ref 78–100)
MONOCYTES # BLD AUTO: 0.7 10E3/UL (ref 0–1.3)
MONOCYTES NFR BLD AUTO: 6 %
NEUTROPHILS # BLD AUTO: 5.5 10E3/UL (ref 1.6–8.3)
NEUTROPHILS NFR BLD AUTO: 51 %
NRBC # BLD AUTO: 0 10E3/UL
NRBC BLD AUTO-RTO: 0 /100
P AXIS - MUSE: NORMAL DEGREES
PLATELET # BLD AUTO: 307 10E3/UL (ref 150–450)
POTASSIUM BLD-SCNC: 3.7 MMOL/L (ref 3.5–5)
PR INTERVAL - MUSE: NORMAL MS
QRS DURATION - MUSE: 78 MS
QT - MUSE: 266 MS
QTC - MUSE: 395 MS
R AXIS - MUSE: 30 DEGREES
RBC # BLD AUTO: 4.61 10E6/UL (ref 3.8–5.2)
SARS-COV-2 RNA RESP QL NAA+PROBE: NEGATIVE
SODIUM SERPL-SCNC: 142 MMOL/L (ref 136–145)
SYSTOLIC BLOOD PRESSURE - MUSE: 115 MMHG
T AXIS - MUSE: 56 DEGREES
T4 FREE SERPL-MCNC: 1.01 NG/DL (ref 0.7–1.8)
TROPONIN I SERPL-MCNC: 0.01 NG/ML (ref 0–0.29)
TSH SERPL DL<=0.005 MIU/L-ACNC: 6.69 UIU/ML (ref 0.3–5)
VENTRICULAR RATE- MUSE: 133 BPM
WBC # BLD AUTO: 11.1 10E3/UL (ref 4–11)

## 2022-04-09 PROCEDURE — 83036 HEMOGLOBIN GLYCOSYLATED A1C: CPT | Performed by: STUDENT IN AN ORGANIZED HEALTH CARE EDUCATION/TRAINING PROGRAM

## 2022-04-09 PROCEDURE — C9803 HOPD COVID-19 SPEC COLLECT: HCPCS

## 2022-04-09 PROCEDURE — 99285 EMERGENCY DEPT VISIT HI MDM: CPT | Mod: 25

## 2022-04-09 PROCEDURE — 99223 1ST HOSP IP/OBS HIGH 75: CPT | Mod: AI | Performed by: STUDENT IN AN ORGANIZED HEALTH CARE EDUCATION/TRAINING PROGRAM

## 2022-04-09 PROCEDURE — 83735 ASSAY OF MAGNESIUM: CPT | Performed by: STUDENT IN AN ORGANIZED HEALTH CARE EDUCATION/TRAINING PROGRAM

## 2022-04-09 PROCEDURE — 210N000002 HC R&B HEART CARE

## 2022-04-09 PROCEDURE — 83605 ASSAY OF LACTIC ACID: CPT

## 2022-04-09 PROCEDURE — 250N000011 HC RX IP 250 OP 636: Performed by: EMERGENCY MEDICINE

## 2022-04-09 PROCEDURE — 96375 TX/PRO/DX INJ NEW DRUG ADDON: CPT

## 2022-04-09 PROCEDURE — 36415 COLL VENOUS BLD VENIPUNCTURE: CPT | Performed by: EMERGENCY MEDICINE

## 2022-04-09 PROCEDURE — 250N000013 HC RX MED GY IP 250 OP 250 PS 637: Performed by: FAMILY MEDICINE

## 2022-04-09 PROCEDURE — 96365 THER/PROPH/DIAG IV INF INIT: CPT | Mod: 59

## 2022-04-09 PROCEDURE — 99223 1ST HOSP IP/OBS HIGH 75: CPT | Performed by: INTERNAL MEDICINE

## 2022-04-09 PROCEDURE — 250N000013 HC RX MED GY IP 250 OP 250 PS 637

## 2022-04-09 PROCEDURE — 83735 ASSAY OF MAGNESIUM: CPT

## 2022-04-09 PROCEDURE — 250N000009 HC RX 250: Performed by: EMERGENCY MEDICINE

## 2022-04-09 PROCEDURE — 80048 BASIC METABOLIC PNL TOTAL CA: CPT | Performed by: EMERGENCY MEDICINE

## 2022-04-09 PROCEDURE — 92960 CARDIOVERSION ELECTRIC EXT: CPT

## 2022-04-09 PROCEDURE — 87635 SARS-COV-2 COVID-19 AMP PRB: CPT | Performed by: EMERGENCY MEDICINE

## 2022-04-09 PROCEDURE — 96367 TX/PROPH/DG ADDL SEQ IV INF: CPT

## 2022-04-09 PROCEDURE — 250N000013 HC RX MED GY IP 250 OP 250 PS 637: Performed by: STUDENT IN AN ORGANIZED HEALTH CARE EDUCATION/TRAINING PROGRAM

## 2022-04-09 PROCEDURE — 96376 TX/PRO/DX INJ SAME DRUG ADON: CPT

## 2022-04-09 PROCEDURE — 5A2204Z RESTORATION OF CARDIAC RHYTHM, SINGLE: ICD-10-PCS | Performed by: EMERGENCY MEDICINE

## 2022-04-09 PROCEDURE — 96366 THER/PROPH/DIAG IV INF ADDON: CPT

## 2022-04-09 PROCEDURE — 85014 HEMATOCRIT: CPT | Performed by: EMERGENCY MEDICINE

## 2022-04-09 PROCEDURE — 250N000013 HC RX MED GY IP 250 OP 250 PS 637: Performed by: INTERNAL MEDICINE

## 2022-04-09 PROCEDURE — 258N000003 HC RX IP 258 OP 636: Performed by: EMERGENCY MEDICINE

## 2022-04-09 PROCEDURE — 83735 ASSAY OF MAGNESIUM: CPT | Performed by: EMERGENCY MEDICINE

## 2022-04-09 PROCEDURE — 84484 ASSAY OF TROPONIN QUANT: CPT | Performed by: EMERGENCY MEDICINE

## 2022-04-09 PROCEDURE — 85610 PROTHROMBIN TIME: CPT | Performed by: EMERGENCY MEDICINE

## 2022-04-09 PROCEDURE — 84439 ASSAY OF FREE THYROXINE: CPT | Performed by: EMERGENCY MEDICINE

## 2022-04-09 PROCEDURE — 999N000157 HC STATISTIC RCP TIME EA 10 MIN

## 2022-04-09 PROCEDURE — 36415 COLL VENOUS BLD VENIPUNCTURE: CPT

## 2022-04-09 PROCEDURE — 84443 ASSAY THYROID STIM HORMONE: CPT | Performed by: EMERGENCY MEDICINE

## 2022-04-09 PROCEDURE — 93005 ELECTROCARDIOGRAM TRACING: CPT | Performed by: EMERGENCY MEDICINE

## 2022-04-09 RX ORDER — ONDANSETRON 4 MG/1
8 TABLET, FILM COATED ORAL EVERY 8 HOURS PRN
COMMUNITY

## 2022-04-09 RX ORDER — NICOTINE POLACRILEX 4 MG
15-30 LOZENGE BUCCAL
Status: DISCONTINUED | OUTPATIENT
Start: 2022-04-09 | End: 2022-04-10 | Stop reason: HOSPADM

## 2022-04-09 RX ORDER — SUMATRIPTAN 25 MG/1
25 TABLET, FILM COATED ORAL
Status: ON HOLD | COMMUNITY
End: 2022-04-10

## 2022-04-09 RX ORDER — METOPROLOL TARTRATE 25 MG/1
25 TABLET, FILM COATED ORAL 2 TIMES DAILY
Status: DISCONTINUED | OUTPATIENT
Start: 2022-04-09 | End: 2022-04-09

## 2022-04-09 RX ORDER — HYDRALAZINE HYDROCHLORIDE 25 MG/1
50 TABLET, FILM COATED ORAL 2 TIMES DAILY
COMMUNITY
End: 2022-04-10

## 2022-04-09 RX ORDER — LIDOCAINE 40 MG/G
CREAM TOPICAL
Status: DISCONTINUED | OUTPATIENT
Start: 2022-04-09 | End: 2022-04-10 | Stop reason: HOSPADM

## 2022-04-09 RX ORDER — METOPROLOL TARTRATE 25 MG/1
25 TABLET, FILM COATED ORAL 2 TIMES DAILY
Status: DISCONTINUED | OUTPATIENT
Start: 2022-04-09 | End: 2022-04-10

## 2022-04-09 RX ORDER — FENTANYL CITRATE 50 UG/ML
50 INJECTION, SOLUTION INTRAMUSCULAR; INTRAVENOUS ONCE
Status: COMPLETED | OUTPATIENT
Start: 2022-04-09 | End: 2022-04-09

## 2022-04-09 RX ORDER — ONDANSETRON 4 MG/1
4 TABLET, ORALLY DISINTEGRATING ORAL EVERY 6 HOURS PRN
Status: DISCONTINUED | OUTPATIENT
Start: 2022-04-09 | End: 2022-04-10 | Stop reason: HOSPADM

## 2022-04-09 RX ORDER — METOPROLOL TARTRATE 50 MG
50 TABLET ORAL 2 TIMES DAILY
Status: DISCONTINUED | OUTPATIENT
Start: 2022-04-09 | End: 2022-04-09

## 2022-04-09 RX ORDER — GABAPENTIN 300 MG/1
300 CAPSULE ORAL 3 TIMES DAILY
Status: DISCONTINUED | OUTPATIENT
Start: 2022-04-09 | End: 2022-04-10 | Stop reason: HOSPADM

## 2022-04-09 RX ORDER — SODIUM CHLORIDE 9 MG/ML
INJECTION, SOLUTION INTRAVENOUS CONTINUOUS
Status: DISCONTINUED | OUTPATIENT
Start: 2022-04-09 | End: 2022-04-09

## 2022-04-09 RX ORDER — ADENOSINE 3 MG/ML
6 INJECTION, SOLUTION INTRAVENOUS ONCE
Status: COMPLETED | OUTPATIENT
Start: 2022-04-09 | End: 2022-04-09

## 2022-04-09 RX ORDER — MELATONIN 10 MG
10 CAPSULE ORAL AT BEDTIME
COMMUNITY

## 2022-04-09 RX ORDER — ONDANSETRON 2 MG/ML
4 INJECTION INTRAMUSCULAR; INTRAVENOUS EVERY 6 HOURS PRN
Status: DISCONTINUED | OUTPATIENT
Start: 2022-04-09 | End: 2022-04-10 | Stop reason: HOSPADM

## 2022-04-09 RX ORDER — METOPROLOL TARTRATE 25 MG/1
25 TABLET, FILM COATED ORAL 2 TIMES DAILY
Qty: 60 TABLET | Refills: 0 | Status: CANCELLED | OUTPATIENT
Start: 2022-04-09

## 2022-04-09 RX ORDER — DILTIAZEM HYDROCHLORIDE 120 MG/1
240 CAPSULE, COATED, EXTENDED RELEASE ORAL DAILY
Status: DISCONTINUED | OUTPATIENT
Start: 2022-04-09 | End: 2022-04-10 | Stop reason: HOSPADM

## 2022-04-09 RX ORDER — POLYETHYLENE GLYCOL 3350 17 G/17G
17 POWDER, FOR SOLUTION ORAL DAILY PRN
Status: DISCONTINUED | OUTPATIENT
Start: 2022-04-09 | End: 2022-04-10 | Stop reason: HOSPADM

## 2022-04-09 RX ORDER — DEXTROSE MONOHYDRATE 25 G/50ML
25-50 INJECTION, SOLUTION INTRAVENOUS
Status: DISCONTINUED | OUTPATIENT
Start: 2022-04-09 | End: 2022-04-10 | Stop reason: HOSPADM

## 2022-04-09 RX ORDER — ADENOSINE 3 MG/ML
12 INJECTION, SOLUTION INTRAVENOUS ONCE
Status: COMPLETED | OUTPATIENT
Start: 2022-04-09 | End: 2022-04-09

## 2022-04-09 RX ORDER — MAGNESIUM SULFATE HEPTAHYDRATE 40 MG/ML
2 INJECTION, SOLUTION INTRAVENOUS ONCE
Status: COMPLETED | OUTPATIENT
Start: 2022-04-09 | End: 2022-04-09

## 2022-04-09 RX ORDER — ACETAMINOPHEN 325 MG/1
650 TABLET ORAL EVERY 6 HOURS PRN
Status: DISCONTINUED | OUTPATIENT
Start: 2022-04-09 | End: 2022-04-10 | Stop reason: HOSPADM

## 2022-04-09 RX ORDER — ACETAMINOPHEN 500 MG
1000 TABLET ORAL EVERY 6 HOURS
COMMUNITY

## 2022-04-09 RX ORDER — DILTIAZEM HYDROCHLORIDE 5 MG/ML
25 INJECTION INTRAVENOUS ONCE
Status: COMPLETED | OUTPATIENT
Start: 2022-04-09 | End: 2022-04-09

## 2022-04-09 RX ORDER — ACETAMINOPHEN 650 MG/1
650 SUPPOSITORY RECTAL EVERY 6 HOURS PRN
Status: DISCONTINUED | OUTPATIENT
Start: 2022-04-09 | End: 2022-04-10 | Stop reason: HOSPADM

## 2022-04-09 RX ORDER — ETOMIDATE 2 MG/ML
15 INJECTION INTRAVENOUS ONCE
Status: COMPLETED | OUTPATIENT
Start: 2022-04-09 | End: 2022-04-09

## 2022-04-09 RX ORDER — WARFARIN SODIUM 5 MG/1
5 TABLET ORAL
Status: COMPLETED | OUTPATIENT
Start: 2022-04-09 | End: 2022-04-09

## 2022-04-09 RX ORDER — METFORMIN HCL 500 MG
1000 TABLET, EXTENDED RELEASE 24 HR ORAL EVERY EVENING
Status: DISCONTINUED | OUTPATIENT
Start: 2022-04-09 | End: 2022-04-10 | Stop reason: HOSPADM

## 2022-04-09 RX ORDER — ACETAMINOPHEN 500 MG
1000 TABLET ORAL EVERY 6 HOURS
Status: DISCONTINUED | OUTPATIENT
Start: 2022-04-09 | End: 2022-04-10 | Stop reason: HOSPADM

## 2022-04-09 RX ORDER — GABAPENTIN 300 MG/1
600 CAPSULE ORAL AT BEDTIME
COMMUNITY

## 2022-04-09 RX ADMIN — METFORMIN HYDROCHLORIDE 1000 MG: 500 TABLET, EXTENDED RELEASE ORAL at 18:17

## 2022-04-09 RX ADMIN — FENTANYL CITRATE 50 MCG: 50 INJECTION, SOLUTION INTRAMUSCULAR; INTRAVENOUS at 04:22

## 2022-04-09 RX ADMIN — METOPROLOL TARTRATE 25 MG: 25 TABLET, FILM COATED ORAL at 11:32

## 2022-04-09 RX ADMIN — ACETAMINOPHEN 1000 MG: 500 TABLET ORAL at 22:15

## 2022-04-09 RX ADMIN — GABAPENTIN 300 MG: 300 CAPSULE ORAL at 11:33

## 2022-04-09 RX ADMIN — GABAPENTIN 300 MG: 300 CAPSULE ORAL at 16:08

## 2022-04-09 RX ADMIN — ACETAMINOPHEN 650 MG: 325 TABLET ORAL at 09:36

## 2022-04-09 RX ADMIN — GABAPENTIN 300 MG: 300 CAPSULE ORAL at 22:15

## 2022-04-09 RX ADMIN — ADENOSINE 12 MG: 3 INJECTION INTRAVENOUS at 01:42

## 2022-04-09 RX ADMIN — ADENOSINE 6 MG: 3 INJECTION INTRAVENOUS at 01:35

## 2022-04-09 RX ADMIN — DILTIAZEM HYDROCHLORIDE 25 MG: 5 INJECTION INTRAVENOUS at 02:22

## 2022-04-09 RX ADMIN — METOPROLOL TARTRATE 25 MG: 25 TABLET, FILM COATED ORAL at 22:16

## 2022-04-09 RX ADMIN — WARFARIN SODIUM 5 MG: 5 TABLET ORAL at 18:17

## 2022-04-09 RX ADMIN — DILTIAZEM HYDROCHLORIDE 240 MG: 120 CAPSULE, COATED, EXTENDED RELEASE ORAL at 11:33

## 2022-04-09 RX ADMIN — FENTANYL CITRATE 50 MCG: 50 INJECTION, SOLUTION INTRAMUSCULAR; INTRAVENOUS at 01:47

## 2022-04-09 RX ADMIN — ETOMIDATE 15 MG: 20 INJECTION, SOLUTION INTRAVENOUS at 04:22

## 2022-04-09 RX ADMIN — Medication 1 MG: at 22:15

## 2022-04-09 RX ADMIN — ACETAMINOPHEN 1000 MG: 500 TABLET ORAL at 16:08

## 2022-04-09 RX ADMIN — DILTIAZEM HYDROCHLORIDE 25 MG: 5 INJECTION INTRAVENOUS at 01:48

## 2022-04-09 RX ADMIN — MAGNESIUM SULFATE HEPTAHYDRATE 2 G: 40 INJECTION, SOLUTION INTRAVENOUS at 01:58

## 2022-04-09 RX ADMIN — DILTIAZEM HYDROCHLORIDE 5 MG/HR: 5 INJECTION INTRAVENOUS at 04:38

## 2022-04-09 ASSESSMENT — ACTIVITIES OF DAILY LIVING (ADL)
ADLS_ACUITY_SCORE: 5
DIFFICULTY_COMMUNICATING: NO
CONCENTRATING,_REMEMBERING_OR_MAKING_DECISIONS_DIFFICULTY: YES
ADLS_ACUITY_SCORE: 5
DRESSING/BATHING_DIFFICULTY: NO
ADLS_ACUITY_SCORE: 5
DOING_ERRANDS_INDEPENDENTLY_DIFFICULTY: NO
ADLS_ACUITY_SCORE: 5
ADLS_ACUITY_SCORE: 5
ADLS_ACUITY_SCORE: 4
ADLS_ACUITY_SCORE: 4
VISION_MANAGEMENT: GLASSES
CHANGE_IN_FUNCTIONAL_STATUS_SINCE_ONSET_OF_CURRENT_ILLNESS/INJURY: YES
WEAR_GLASSES_OR_BLIND: YES
ADLS_ACUITY_SCORE: 4
HEARING_DIFFICULTY_OR_DEAF: NO
FALL_HISTORY_WITHIN_LAST_SIX_MONTHS: NO
ADLS_ACUITY_SCORE: 5
TOILETING_ISSUES: NO
ADLS_ACUITY_SCORE: 5
ADLS_ACUITY_SCORE: 5
ADLS_ACUITY_SCORE: 4
WALKING_OR_CLIMBING_STAIRS_DIFFICULTY: NO
DIFFICULTY_EATING/SWALLOWING: NO
ADLS_ACUITY_SCORE: 5

## 2022-04-09 ASSESSMENT — ENCOUNTER SYMPTOMS
NAUSEA: 0
VOMITING: 0
NUMBNESS: 0
PALPITATIONS: 1
SHORTNESS OF BREATH: 1
WEAKNESS: 0
HEADACHES: 1

## 2022-04-09 NOTE — PHARMACY-ANTICOAGULATION SERVICE
Clinical Pharmacy - Warfarin Dosing Consult     Pharmacy has been consulted to manage this patient s warfarin therapy.  Indication: Atrial Fibrillation  Therapy Goal: INR 2-3  Provider/Team: [facility]/Dr Fairbanks  Warfarin Prior to Admission: Yes  Warfarin PTA Regimen: 2.5 mg on Wednesdays and Saturdays;  5 mg all other days  Dose Comments: Will dose 5 mg today (normally would receive 2.5 mg today)    INR   Date Value Ref Range Status   04/09/2022 1.72 (H) 0.85 - 1.15 Final   09/08/2019 2.59 (H) 0.90 - 1.10 Final       Recommend warfarin 5 mg today.  Pharmacy will monitor Milagro P Piemonte daily and order warfarin doses to achieve specified goal.      Please contact pharmacy as soon as possible if the warfarin needs to be held for a procedure or if the warfarin goals change.

## 2022-04-09 NOTE — PLAN OF CARE
"Goal Outcome Evaluation: Ongoing.     Plan of Care Reviewed With: patient     Overall Patient Progress: improving       Problem: Plan of Care - These are the overarching goals to be used throughout the patient stay.    Goal: Plan of Care Review/Shift Note  Description: The Plan of Care Review/Shift note should be completed every shift.  The Outcome Evaluation is a brief statement about your assessment that the patient is improving, declining, or no change.  This information will be displayed automatically on your shift note.  Outcome: Ongoing, Progressing  Flowsheets (Taken 4/9/2022 1801)  Plan of Care Reviewed With: patient  Overall Patient Progress: improving  Goal: Patient-Specific Goal (Individualized)  Description: You can add care plan individualizations to a care plan. Examples of Individualization might be:  \"Parent requests to be called daily at 9am for status\", \"I have a hard time hearing out of my right ear\", or \"Do not touch me to wake me up as it startles me\".  Outcome: Ongoing, Progressing  Goal: Absence of Hospital-Acquired Illness or Injury  Outcome: Met  Intervention: Identify and Manage Fall Risk  Recent Flowsheet Documentation  Taken 4/9/2022 1613 by Umm Wilhelm, RN  Safety Promotion/Fall Prevention:    assistive device/personal items within reach    activity supervised    nonskid shoes/slippers when out of bed    clutter free environment maintained  Taken 4/9/2022 1141 by Umm Wilhelm, RN  Safety Promotion/Fall Prevention:    assistive device/personal items within reach    activity supervised    nonskid shoes/slippers when out of bed    clutter free environment maintained  Taken 4/9/2022 0841 by Umm Wilhelm, RN  Safety Promotion/Fall Prevention:    assistive device/personal items within reach    activity supervised    nonskid shoes/slippers when out of bed    clutter free environment maintained  Intervention: Prevent Skin Injury  Recent Flowsheet Documentation  Taken 4/9/2022 " 1613 by Umm Wilhelm RN  Body Position: position changed independently  Taken 4/9/2022 1141 by Umm Wilhelm RN  Body Position: position changed independently  Taken 4/9/2022 0841 by Umm Wilhelm RN  Body Position: position changed independently  Intervention: Prevent and Manage VTE (Venous Thromboembolism) Risk  Recent Flowsheet Documentation  Taken 4/9/2022 1613 by Umm Wilhelm RN  Activity Management: activity adjusted per tolerance  Taken 4/9/2022 1141 by Umm Wilhelm RN  VTE Prevention/Management: SCDs (sequential compression devices) off  Activity Management: activity adjusted per tolerance  Taken 4/9/2022 0841 by Umm Wilhelm RN  VTE Prevention/Management: SCDs (sequential compression devices) off  Activity Management: activity adjusted per tolerance  Goal: Optimal Comfort and Wellbeing  Outcome: Ongoing, Progressing  Intervention: Monitor Pain and Promote Comfort  Recent Flowsheet Documentation  Taken 4/9/2022 1653 by Umm Wilhelm RN  Pain Management Interventions: rest  Taken 4/9/2022 1610 by Umm Wilhelm RN  Pain Management Interventions: medication (see MAR)  Taken 4/9/2022 1141 by Umm Wilhelm RN  Pain Management Interventions: (uses gabapentin ) medication (see MAR)  Taken 4/9/2022 1021 by Umm Wilhelm RN  Pain Management Interventions: rest  Taken 4/9/2022 1020 by Umm Wilhelm RN  Pain Management Interventions: rest  Goal: Readiness for Transition of Care  Outcome: Ongoing, Progressing  Intervention: Mutually Develop Transition Plan  Recent Flowsheet Documentation  Taken 4/9/2022 0800 by Umm Wilhelm RN  Equipment Currently Used at Home: none    Pt diltiazem drip stopped around 1230 and transitioned to PO form along with starting metoprolol (25mg). Ambulated around unit with staff and HR averaged 100's to 130's. PT states her baseline HR at home is around 70/80's. Denied any chest pain/palpitations during activity.  Managed headache with tylenol and gabapentin. Continue to monitor overnight, possible discharge in the morning (4/10)

## 2022-04-09 NOTE — PHARMACY-ADMISSION MEDICATION HISTORY
Pharmacy Note - Admission Medication History    Pertinent Provider Information:   Sumatriptan: new medication as of 4/6/22. Patient has taken 2 doses total so far. After she took a dose last night she went into Afib. She states she will hold this medication until further follow-up with outpatient provider.     Patient has taken magnesium oxide 400mg BID in the past but has not for several months. May resume in future.      ______________________________________________________________________    Prior To Admission (PTA) med list completed and updated in EMR.       PTA Med List   Medication Sig Note Last Dose     acetaminophen (TYLENOL) 500 MG tablet Take 1,000 mg by mouth every 6 hours  4/8/2022     clobetasol (TEMOVATE) 0.05 % cream [CLOBETASOL (TEMOVATE) 0.05 % CREAM] Apply 1 application topically 2 (two) times a day as needed (eczema).   More than a month at Unknown time     diltiazem (CARDIZEM) 60 MG tablet [DILTIAZEM (CARDIZEM) 60 MG TABLET] Take 1 tablet (60 mg total) by mouth every 6 (six) hours as needed (rapid heartbeat).  4/8/2022 at hs     gabapentin (NEURONTIN) 300 MG capsule Take 300 mg by mouth 3 times daily  4/8/2022 at x3 doses     HEMP OIL OR EXTRACT OR OTHER CBD CANNABINOID, NOT MEDICAL CANNABIS, Take 1 capsule by mouth daily  4/8/2022     hydrALAZINE (APRESOLINE) 25 MG tablet Take 50 mg by mouth 2 times daily  4/8/2022 at Unknown time     hydrocortisone 1 % cream [HYDROCORTISONE 1 % CREAM] Apply 1 application topically 2 (two) times a day as needed (itching).         More than a month at Unknown time     levonorgestrel (MIRENA) 20 mcg/24 hr (5 years) IUD [LEVONORGESTREL (MIRENA) 20 MCG/24 HR (5 YEARS) IUD] 1 Device by Intrauterine route. 4/9/2022: 7 years old, needs to be replaced per patient More than a month at Unknown time     Melatonin 10 MG CAPS Take 10 mg by mouth At Bedtime 4/9/2022: Take with 5mg gummy for 15mg qhs 4/8/2022 at Unknown time     Melatonin 5 MG CHEW Take 5 mg by mouth At  Bedtime   2022 at Unknown time     metFORMIN (GLUCOPHAGE-XR) 500 MG 24 hr tablet [METFORMIN (GLUCOPHAGE-XR) 500 MG 24 HR TABLET] Take 1,000 mg by mouth every evening.  2022 at Unknown time     ondansetron (ZOFRAN) 4 MG tablet Take 8 mg by mouth every 8 hours as needed for nausea  More than a month at Unknown time     SUMAtriptan (IMITREX) 25 MG tablet Take 25 mg by mouth at onset of headache for migraine 2022: New medication (prescribed 22). Patient to hold this medication. 2022 at Unknown time     tiZANidine (ZANAFLEX) 4 MG tablet Take 4 mg by mouth every 8 hours as needed for muscle spasms 2022: Typical use: once daily at night 2022 at hs     warfarin (COUMADIN/JANTOVEN) 5 MG tablet Take 2.5-5 mg by mouth See Admin Instructions Take 2.5mg Wednesday and Saturday. Take 5mg all other days  2022 at Unknown time       Information source(s): [facility]  Method of interview communication: in-person    Summary of Changes to PTA Med List  New: hydralazine, gabapentin, sumatriptan, tizanidine, CBD, Tylenol  Discontinued: sotalol, magnesium, old rx for Contour test strips, Epipen (), Flonase (gives her nose bleeds), Hydroxyzine (QT prolongation)  Changed: melatonin dose    Patient was asked about OTC/herbal products specifically.  PTA med list reflects this.    In the past week, patient estimated taking medication this percent of the time:  greater than 90%.    Allergies were reviewed, assessed, and updated with the patient.      Patient does not anticipate needing any multi-use medications during admission.    The information provided in this note is only as accurate as the sources available at the time of the update(s).    Thank you for the opportunity to participate in the care of this patient.    Stephie Miller  2022 8:07 AM

## 2022-04-09 NOTE — ED NOTES
RT called for cardiovert.Sx and ambu set up at bedside. Pt on RA at start and 2l/m nc added after sedation. Sats remained in the 90's patient awake and responding to questions.
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
## 2022-04-09 NOTE — CONSULTS
Impression and Plan     Assessment:  1. Atrial fibrillation with RVR - currently on diltiazem gtt and failed cardioversion attempt in ER. Has a history of afib with 2 prior ablations. On warfarin for stroke prevention. Rate borderline controlled and currently asymptomatic.  2. Morbid obesity with BMI 45-50, complicated by DMII, afib, CALLY  3. DMII on oral medication without complication  4. CALLY on CPAP  5. Hypertension    Plan:    Transition diltiazem gtt to PO     Start diltiazem  mg daily    Start metoprolol tartrate 25 mg BID    If HR remains <110 bpm at rest by mid afternoon then she may be able to discharge this evening. If HR remains elevated by 1500, then would increase metoprolol to 50 mg BID    No procedures planned. Does not need to be NPO from cardiac standpoint.    Primary Cardiologist: Dr. Rodriguez ([facility])    History of Present Illness      Ms. Milagro Thomas is a 46 year old female with morbid obesity, CALLY on CPAP, atrial fibrillation with 2 prior PVI ablation procedures (2019 and 6/2021), DMII, hypertension who was admitted with palpitations and afib with RVR.    Ms. Thomas developed sudden onset palpitations with shortness of breath and lightheadedness around midnight last night.  She was awake at the time.  She attempted to take an oral dose of diltiazem without any improvement in her symptoms.  She presented to the emergency department where she was noted to be in a very rapid SVT.  She was given IV adenosine and converted from the regular SVT into atrial fibrillation with RVR.  She was started on a diltiazem infusion which is currently running at 15 mg/hr. her heart rate is 95 to 120 bpm and she is currently asymptomatic.    She is currently on warfarin for stroke prevention. Was previously on Eliquis, but changed to warfarin due to gyn bleeding symptoms.    Other than noted above, Ms. Thomas denies any chest pain/pressure/tightness, shortness of breath at rest or with exertion,  light headedness/dizziness, pre-syncope, syncope, lower extremity swelling, palpitations, paroxysmal nocturnal dyspnea (PND), or orthopnea.    Review of Systems:  Further review of systems is otherwise negative/noncontributory (based on review of medical record (admission H&P) and 13 point review of systems reviewed. Pertinent positives noted).    Cardiac Diagnostics     ECG: Personally reviewed and interpreted: initially an SVT at a HR of 212 bpm with diffuse ST depression c/w subendocardial ischemia followed by afib with RVR and nonspecific ST/T wave changes.  bpm.    Telemetry (personally reviewed): afib with mild RVR.    Most recent:  Echocardiogram (results reviewed):   TTE 11/8/21 ([facility])  Final Conclusion Previous Study: 3/12/2019    1.  Technically challenging echocardiogram.    2.  Mild left ventricular chamber enlargement.Normal left ventricular systolic   function.Calculated left ventricular ejection fraction    (modified Patel technique) is 58 %.    3.  No regional wall motion abnormalities.    4.  Right ventricle was not well visualized.Grossly Normal right ventricular systolic   function.    5.  No significant valvular heart disease though tricuspid valve is not well seen.       TTE 2/9/19    1.Left ventricle ejection fraction is normal. The calculated left ventricular ejection fraction is 61%.    2.TAPSE is normal, which is consistent with normal right ventricular systolic function.    3.No hemodynamically significant valvular heart abnormalities.    4.Poor images for analysis despite the use of Definity contrast.    5.When compared to the previous study dated 5/7/2018, no significant change.        Medical History  Surgical History Family History Social History   Past Medical History:   Diagnosis Date     Essential hypertension      Morbid obesity (H)      CALLY (obstructive sleep apnea)      Paroxysmal atrial fibrillation (H)     s/p ablation x2     TBI (traumatic brain injury) (H)      Type  2 diabetes mellitus without complication, without long-term current use of insulin (H)      Past Surgical History:   Procedure Laterality Date      SECTION      x2     CHOLECYSTECTOMY       FOOT SURGERY      bone spur and reconstruction     Family History   Problem Relation Age of Onset     Diabetes Father      CABG Father 50.00     Atrial fibrillation Father      Sleep Apnea Father      Skin Cancer Father      Diabetes Maternal Aunt      No Known Problems Mother      No Known Problems Son            Social History     Socioeconomic History     Marital status:      Spouse name: Not on file     Number of children: 2     Years of education: Not on file     Highest education level: Not on file   Occupational History     Not on file   Tobacco Use     Smoking status: Former Smoker     Types: Cigarettes, Cigarettes     Smokeless tobacco: Never Used   Substance and Sexual Activity     Alcohol use: No     Drug use: No     Sexual activity: Yes     Partners: Male     Birth control/protection: Implant   Other Topics Concern     Not on file   Social History Narrative     Not on file     Social Determinants of Health     Financial Resource Strain: Not on file   Food Insecurity: Not on file   Transportation Needs: Not on file   Physical Activity: Not on file   Stress: Not on file   Social Connections: Not on file   Intimate Partner Violence: Not on file   Housing Stability: Not on file             Physical Examination   VITALS: /79 (BP Location: Left arm)   Pulse 100   Temp 97.7  F (36.5  C) (Oral)   Resp 20   Wt 140 kg (308 lb 10.3 oz)   SpO2 96%   BMI 45.58 kg/m    BMI: Body mass index is 45.58 kg/m .  Wt Readings from Last 3 Encounters:   22 140 kg (308 lb 10.3 oz)   19 139.7 kg (308 lb)   17 142 kg (313 lb)       Intake/Output Summary (Last 24 hours) at 2022 1022  Last data filed at 2022 0433  Gross per 24 hour   Intake 100 ml   Output --   Net 100 ml       General  Appearance:  Alert, cooperative, no distress, appears stated age    Head:  Normocephalic, without obvious abnormality, atraumatic   Eyes:  PERRL, conjunctiva/corneas clear, EOM's intact   Ears:  Normal external ear canals bilaterally   Nose: Nares normal, septum midline, no drainage   Throat: Lips, mucosa, and tongue normal; teeth and gums normal   Neck: Supple, no carotid bruit or JVD   Back:   Symmetric, no abnormal curvature, ROM normal   Lungs:   Clear to auscultation bilaterally, respirations unlabored   Chest Wall:  No tenderness or deformity   Heart:  Irregularly irregular rhythm. Mildly fast rate, S1, S2 normal,no murmur, rub or gallop   Abdomen:   Soft, non-tender, bowel sounds active all four quadrants,  no masses, no organomegaly   Extremities: Extremities normal, atraumatic, no cyanosis or edema   Skin: Skin color, texture, turgor normal, no rashes or lesions   Psychiatric: Normal affect, pleasant and cooperative   Neurologic: Alert and oriented X 3, Moves all 4 extremities            Imaging      No non-cardiac imaging.       Lab Results    Chemistry/lipid CBC Cardiac Enzymes/BNP/TSH/INR   Recent Labs   Lab Test 05/23/17  0336   CHOL 134   HDL 32*   LDL 79   TRIG 114     Recent Labs   Lab Test 05/23/17  0336   LDL 79     Recent Labs   Lab Test 04/09/22  0844 04/09/22  0125   NA  --  142   POTASSIUM  --  3.7   CHLORIDE  --  104   CO2  --  23   * 157*   BUN  --  13   CR  --  0.80   GFRESTIMATED  --  >90   ANJU  --  10.3     Recent Labs   Lab Test 04/09/22 0125 09/07/19  1353 02/08/19  1935   CR 0.80 0.70 0.79     Recent Labs   Lab Test 04/09/22 0125 09/07/19  1353 01/19/19  0355   A1C 6.5* 6.3* 7.2*          Recent Labs   Lab Test 04/09/22  0125   WBC 11.1*   HGB 12.1   HCT 38.4   MCV 83        Recent Labs   Lab Test 04/09/22 0125 09/07/19  1353 02/08/19  1935   HGB 12.1 11.6* 13.0    Recent Labs   Lab Test 04/09/22 0125 09/07/19  1353 02/09/19  0743   TROPONINI 0.01 <0.01 <0.01      Recent Labs   Lab Test 09/07/19  1353 02/08/19 2016 01/18/19  1607   * 285* 73*     Recent Labs   Lab Test 04/09/22  0125   TSH 6.69*     Recent Labs   Lab Test 04/09/22  0125 09/08/19  0402 09/07/19  1353   INR 1.72* 2.59* 2.36*           Current Inpatient Scheduled Medications   Scheduled Meds:    insulin aspart  1-3 Units Subcutaneous TID AC     insulin aspart  1-3 Units Subcutaneous At Bedtime     sodium chloride (PF)  3 mL Intracatheter Q8H     warfarin ANTICOAGULANT  5 mg Oral ONCE at 18:00     Continuous Infusions:    dilTIAZem HCl-Sodium Chloride 15 mg/hr (04/09/22 0810)     - MEDICATION INSTRUCTIONS -       Warfarin Therapy Reminder         No current outpatient medications on file.          Medications Prior to Admission   Prior to Admission medications    Medication Sig Start Date End Date Taking? Authorizing Provider   acetaminophen (TYLENOL) 500 MG tablet Take 1,000 mg by mouth every 6 hours   Yes Unknown, Entered By History   clobetasol (TEMOVATE) 0.05 % cream [CLOBETASOL (TEMOVATE) 0.05 % CREAM] Apply 1 application topically 2 (two) times a day as needed (eczema).  5/23/17  Yes Provider, Historical   diltiazem (CARDIZEM) 60 MG tablet [DILTIAZEM (CARDIZEM) 60 MG TABLET] Take 1 tablet (60 mg total) by mouth every 6 (six) hours as needed (rapid heartbeat). 9/8/19  Yes Elio Rodriguez, DO   gabapentin (NEURONTIN) 300 MG capsule Take 300 mg by mouth 3 times daily   Yes Unknown, Entered By History   HEMP OIL OR EXTRACT OR OTHER CBD CANNABINOID, NOT MEDICAL CANNABIS, Take 1 capsule by mouth daily   Yes Unknown, Entered By History   hydrALAZINE (APRESOLINE) 25 MG tablet Take 50 mg by mouth 2 times daily   Yes Unknown, Entered By History   hydrocortisone 1 % cream [HYDROCORTISONE 1 % CREAM] Apply 1 application topically 2 (two) times a day as needed (itching).        5/23/17  Yes Provider, Historical   levonorgestrel (MIRENA) 20 mcg/24 hr (5 years) IUD [LEVONORGESTREL (MIRENA) 20 MCG/24 HR (5 YEARS)  IUD] 1 Device by Intrauterine route. 2/17/15  Yes Provider, Historical   Melatonin 10 MG CAPS Take 10 mg by mouth At Bedtime   Yes Unknown, Entered By History   Melatonin 5 MG CHEW Take 5 mg by mouth At Bedtime  1/18/19  Yes Provider, Historical   metFORMIN (GLUCOPHAGE-XR) 500 MG 24 hr tablet [METFORMIN (GLUCOPHAGE-XR) 500 MG 24 HR TABLET] Take 1,000 mg by mouth every evening. 9/7/19  Yes Provider, Historical   ondansetron (ZOFRAN) 4 MG tablet Take 8 mg by mouth every 8 hours as needed for nausea   Yes Unknown, Entered By History   SUMAtriptan (IMITREX) 25 MG tablet Take 25 mg by mouth at onset of headache for migraine   Yes Unknown, Entered By History   tiZANidine (ZANAFLEX) 4 MG tablet Take 4 mg by mouth every 8 hours as needed for muscle spasms   Yes Unknown, Entered By History   warfarin (COUMADIN/JANTOVEN) 5 MG tablet Take 2.5-5 mg by mouth See Admin Instructions Take 2.5mg Wednesday and Saturday. Take 5mg all other days 9/7/19  Yes Provider, Historical
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
## 2022-04-10 VITALS
HEIGHT: 68 IN | RESPIRATION RATE: 18 BRPM | BODY MASS INDEX: 44.41 KG/M2 | HEART RATE: 114 BPM | WEIGHT: 293 LBS | OXYGEN SATURATION: 96 % | TEMPERATURE: 98 F | DIASTOLIC BLOOD PRESSURE: 68 MMHG | SYSTOLIC BLOOD PRESSURE: 145 MMHG

## 2022-04-10 LAB
ANION GAP SERPL CALCULATED.3IONS-SCNC: 11 MMOL/L (ref 5–18)
BUN SERPL-MCNC: 11 MG/DL (ref 8–22)
CALCIUM SERPL-MCNC: 9.5 MG/DL (ref 8.5–10.5)
CHLORIDE BLD-SCNC: 104 MMOL/L (ref 98–107)
CO2 SERPL-SCNC: 23 MMOL/L (ref 22–31)
CREAT SERPL-MCNC: 0.7 MG/DL (ref 0.6–1.1)
GFR SERPL CREATININE-BSD FRML MDRD: >90 ML/MIN/1.73M2
GLUCOSE BLD-MCNC: 115 MG/DL (ref 70–125)
INR PPP: 1.9 (ref 0.85–1.15)
MAGNESIUM SERPL-MCNC: 1.8 MG/DL (ref 1.8–2.6)
POTASSIUM BLD-SCNC: 4.3 MMOL/L (ref 3.5–5)
SODIUM SERPL-SCNC: 138 MMOL/L (ref 136–145)

## 2022-04-10 PROCEDURE — 250N000013 HC RX MED GY IP 250 OP 250 PS 637: Performed by: INTERNAL MEDICINE

## 2022-04-10 PROCEDURE — 80048 BASIC METABOLIC PNL TOTAL CA: CPT

## 2022-04-10 PROCEDURE — 250N000013 HC RX MED GY IP 250 OP 250 PS 637

## 2022-04-10 PROCEDURE — 99233 SBSQ HOSP IP/OBS HIGH 50: CPT | Performed by: INTERNAL MEDICINE

## 2022-04-10 PROCEDURE — 36415 COLL VENOUS BLD VENIPUNCTURE: CPT

## 2022-04-10 PROCEDURE — 83735 ASSAY OF MAGNESIUM: CPT

## 2022-04-10 PROCEDURE — 250N000013 HC RX MED GY IP 250 OP 250 PS 637: Performed by: STUDENT IN AN ORGANIZED HEALTH CARE EDUCATION/TRAINING PROGRAM

## 2022-04-10 PROCEDURE — 99238 HOSP IP/OBS DSCHRG MGMT 30/<: CPT | Mod: GC | Performed by: STUDENT IN AN ORGANIZED HEALTH CARE EDUCATION/TRAINING PROGRAM

## 2022-04-10 PROCEDURE — 85610 PROTHROMBIN TIME: CPT | Performed by: STUDENT IN AN ORGANIZED HEALTH CARE EDUCATION/TRAINING PROGRAM

## 2022-04-10 RX ORDER — TIZANIDINE 2 MG/1
4 TABLET ORAL EVERY 8 HOURS PRN
Status: DISCONTINUED | OUTPATIENT
Start: 2022-04-10 | End: 2022-04-10 | Stop reason: HOSPADM

## 2022-04-10 RX ORDER — METOPROLOL TARTRATE 25 MG/1
25 TABLET, FILM COATED ORAL ONCE
Status: COMPLETED | OUTPATIENT
Start: 2022-04-10 | End: 2022-04-10

## 2022-04-10 RX ORDER — DILTIAZEM HYDROCHLORIDE 240 MG/1
240 CAPSULE, COATED, EXTENDED RELEASE ORAL DAILY
Qty: 30 CAPSULE | Refills: 0 | Status: SHIPPED | OUTPATIENT
Start: 2022-04-10 | End: 2022-06-07

## 2022-04-10 RX ORDER — METOPROLOL TARTRATE 50 MG
50 TABLET ORAL 2 TIMES DAILY
Status: DISCONTINUED | OUTPATIENT
Start: 2022-04-10 | End: 2022-04-10 | Stop reason: HOSPADM

## 2022-04-10 RX ORDER — METOPROLOL TARTRATE 50 MG
50 TABLET ORAL 2 TIMES DAILY
Qty: 60 TABLET | Refills: 0 | Status: SHIPPED | OUTPATIENT
Start: 2022-04-10 | End: 2022-06-07

## 2022-04-10 RX ADMIN — ACETAMINOPHEN 1000 MG: 500 TABLET ORAL at 04:10

## 2022-04-10 RX ADMIN — GABAPENTIN 300 MG: 300 CAPSULE ORAL at 09:16

## 2022-04-10 RX ADMIN — TIZANIDINE 4 MG: 2 TABLET ORAL at 00:55

## 2022-04-10 RX ADMIN — DILTIAZEM HYDROCHLORIDE 240 MG: 120 CAPSULE, COATED, EXTENDED RELEASE ORAL at 09:17

## 2022-04-10 RX ADMIN — METOPROLOL TARTRATE 25 MG: 25 TABLET, FILM COATED ORAL at 09:17

## 2022-04-10 RX ADMIN — METOPROLOL TARTRATE 25 MG: 25 TABLET, FILM COATED ORAL at 09:38

## 2022-04-10 RX ADMIN — ACETAMINOPHEN 1000 MG: 500 TABLET ORAL at 09:17

## 2022-04-10 ASSESSMENT — ACTIVITIES OF DAILY LIVING (ADL)
ADLS_ACUITY_SCORE: 5

## 2022-04-10 NOTE — PLAN OF CARE
Problem: Plan of Care - These are the overarching goals to be used throughout the patient stay.    Goal: Plan of Care Review/Shift Note  Description: The Plan of Care Review/Shift note should be completed every shift.  The Outcome Evaluation is a brief statement about your assessment that the patient is improving, declining, or no change.  This information will be displayed automatically on your shift note.  Outcome: Ongoing, Progressing     Problem: Dysrhythmia  Goal: Normalized Cardiac Rhythm  Outcome: Ongoing, Progressing   Goal Outcome Evaluation:        Pt complaining of headache. Tolerating diet and voiding. A fib on monitor, scheduled metoprolol given.

## 2022-04-10 NOTE — PLAN OF CARE
Goal Outcome Evaluation: Met, adequate for discharge home.    DISCHARGE             04/10/22  11:45 AM  ----------------------------------------------------------------------------  Discharged to: Home  Via: Automobile  Accompanied by: Family  Discharge Instructions: diet, activity, medications, follow up appointments, when to call the MD, aftercare instructions, and what to watchout for (i.e. s/s of infection, increasing SOB, palpitations, chest pain,)  Prescriptions: To be filled by   [facility]    pharmacy per pt's request; medication list reviewed & sent with pt  Follow Up Appointments: arranged; information given  Belongings: All sent with pt  IV: out  Telemetry: off  Pt exhibits understanding of above discharge instructions; all questions answered.    Discharge Paperwork: Signed, copied, and sent home with patient.     Pt continues to tolerate ER diltiazem. HTN this morning, metoprolol increased per cardiology. Education provided. HR 80s-130s. MDs aware. All belongings (CPAP, phone, clothing etc.) taken by patient with discharge.    Cherelle Moreno RN

## 2022-04-10 NOTE — PROGRESS NOTES
Impression and Plan     Impression:   1. Atrial fibrillation with RVR - Failed cardioversion attempt in ER. Has a history of afib with 2 prior ablations. On warfarin for stroke prevention. Rate not optimally controlled on diltiazem 240 mg daily and metoprolol 25 mg BID.   2. Morbid obesity with BMI 45-50, complicated by DMII, afib, CALLY  3. DMII on oral medication without complication  4. CALLY on CPAP  5. Hypertension    Plan:    Increase metoprolol to 50 mg BID with additional 25 mg now.    Can discharge later this morning even in rate not optimally controlled. She has a longstanding therapeutic relationship with Dr. Rodriguez at [facility] and will be able to contact him tomorrow to help arrange for close outpatient follow-up.    Discussed with Dr. Acuna.    Primary Cardiologist: Dr. Rodriguez ([facility])    Subjective     Feeling better today. No shortness of breath. Feels able to go home.    Cardiac Diagnostics   Telemetry (personally reviewed): atrial fibrillation with HR ranging from  bpm.    Echocardiogram (results reviewed):   TTE 11/8/21 ([facility])  Final Conclusion Previous Study: 3/12/2019    1.  Technically challenging echocardiogram.    2.  Mild left ventricular chamber enlargement.Normal left ventricular systolic   function.Calculated left ventricular ejection fraction    (modified Patel technique) is 58 %.    3.  No regional wall motion abnormalities.    4.  Right ventricle was not well visualized.Grossly Normal right ventricular systolic   function.    5.  No significant valvular heart disease though tricuspid valve is not well seen.         TTE 2/9/19    1.Left ventricle ejection fraction is normal. The calculated left ventricular ejection fraction is 61%.    2.TAPSE is normal, which is consistent with normal right ventricular systolic function.    3.No hemodynamically significant valvular heart abnormalities.    4.Poor images for analysis despite the use of Definity contrast.    5.When  "compared to the previous study dated 5/7/2018, no significant change.    Physical Examination       BP (!) 145/96 (BP Location: Right arm)   Pulse 120   Temp 97.9  F (36.6  C) (Oral)   Resp 18   Ht 1.727 m (5' 8\")   Wt 138.2 kg (304 lb 11.2 oz)   SpO2 94%   BMI 46.33 kg/m            Intake/Output Summary (Last 24 hours) at 4/10/2022 0925  Last data filed at 4/10/2022 0400  Gross per 24 hour   Intake 1250 ml   Output 1525 ml   Net -275 ml       General: pleasant female. No acute distress.  HENT: external ears normal. Nares patent. Mucous membranes moist.  Eyes: perrla, extraocular muscles intact. No scleral icterus.   Neck: No JVD  Lungs: clear to auscultation  COR: fast rate, irregularly irregular rhythm. No murmurs, rubs, or gallops  Abd: nondistended, BS present  Extrem: No edema         Imaging      No new imaging.    Lab Results   Lab Results   Component Value Date    CHOL 134 05/23/2017    HDL 32 (L) 05/23/2017    TRIG 114 05/23/2017    BUN 11 04/10/2022     04/10/2022    CO2 23 04/10/2022       Lab Results   Component Value Date    WBC 11.1 (H) 04/09/2022    HGB 12.1 04/09/2022    HCT 38.4 04/09/2022    MCV 83 04/09/2022     04/09/2022       Lab Results   Component Value Date    TROPONINI 0.01 04/09/2022     (H) 09/07/2019    TSH 6.69 (H) 04/09/2022    INR 1.90 (H) 04/10/2022               Current Inpatient Scheduled Medications   Scheduled Meds:    acetaminophen  1,000 mg Oral Q6H     diltiazem ER COATED BEADS  240 mg Oral Daily     gabapentin  300 mg Oral TID     metFORMIN  1,000 mg Oral QPM     metoprolol tartrate  25 mg Oral BID     sodium chloride (PF)  3 mL Intracatheter Q8H     Continuous Infusions:    - MEDICATION INSTRUCTIONS -       Warfarin Therapy Reminder              Medications Prior to Admission   Prior to Admission medications    Medication Sig Start Date End Date Taking? Authorizing Provider   acetaminophen (TYLENOL) 500 MG tablet Take 1,000 mg by mouth every 6 " hours   Yes Unknown, Entered By History   clobetasol (TEMOVATE) 0.05 % cream [CLOBETASOL (TEMOVATE) 0.05 % CREAM] Apply 1 application topically 2 (two) times a day as needed (eczema).  5/23/17  Yes Provider, Historical   diltiazem (CARDIZEM) 60 MG tablet [DILTIAZEM (CARDIZEM) 60 MG TABLET] Take 1 tablet (60 mg total) by mouth every 6 (six) hours as needed (rapid heartbeat). 9/8/19  Yes Elio Rodriguez,    gabapentin (NEURONTIN) 300 MG capsule Take 300 mg by mouth 3 times daily   Yes Unknown, Entered By History   HEMP OIL OR EXTRACT OR OTHER CBD CANNABINOID, NOT MEDICAL CANNABIS, Take 1 capsule by mouth daily   Yes Unknown, Entered By History   hydrALAZINE (APRESOLINE) 25 MG tablet Take 50 mg by mouth 2 times daily   Yes Unknown, Entered By History   hydrocortisone 1 % cream [HYDROCORTISONE 1 % CREAM] Apply 1 application topically 2 (two) times a day as needed (itching).        5/23/17  Yes Provider, Historical   levonorgestrel (MIRENA) 20 mcg/24 hr (5 years) IUD [LEVONORGESTREL (MIRENA) 20 MCG/24 HR (5 YEARS) IUD] 1 Device by Intrauterine route. 2/17/15  Yes Provider, Historical   Melatonin 10 MG CAPS Take 10 mg by mouth At Bedtime   Yes Unknown, Entered By History   Melatonin 5 MG CHEW Take 5 mg by mouth At Bedtime  1/18/19  Yes Provider, Historical   metFORMIN (GLUCOPHAGE-XR) 500 MG 24 hr tablet [METFORMIN (GLUCOPHAGE-XR) 500 MG 24 HR TABLET] Take 1,000 mg by mouth every evening. 9/7/19  Yes Provider, Historical   ondansetron (ZOFRAN) 4 MG tablet Take 8 mg by mouth every 8 hours as needed for nausea   Yes Unknown, Entered By History   SUMAtriptan (IMITREX) 25 MG tablet Take 25 mg by mouth at onset of headache for migraine   Yes Unknown, Entered By History   tiZANidine (ZANAFLEX) 4 MG tablet Take 4 mg by mouth every 8 hours as needed for muscle spasms   Yes Unknown, Entered By History   warfarin (COUMADIN/JANTOVEN) 5 MG tablet Take 2.5-5 mg by mouth See Admin Instructions Take 2.5mg Wednesday and Saturday. Take  5mg all other days 9/7/19  Yes Provider, Historical

## 2022-04-10 NOTE — PLAN OF CARE
Goal: Optimal Comfort and Wellbeing  Outcome: Ongoing, Progressing    Problem: Pain Chronic (Persistent) (Comorbidity Management)  Goal: Acceptable Pain Control and Functional Ability  Outcome: Ongoing, Progressing     Pt complained of a headache, seems to be pretty well managed with scheduled tylenol. Pt requested PTA medication, Zanaflex; got medication PRN and gave x1. Pt has glasses and headphones to help reduce stimuli that cause migraines. Pt resting, will continue to monitor.

## 2022-04-12 NOTE — DISCHARGE SUMMARY
[facility]  Discharge Summary - Medicine & Pediatrics       Date of Admission:  4/9/2022  Date of Discharge:  4/10/2022 12:27 PM  Discharging Provider: Lynne Momin MD  Discharge Service: Hospitalist Service    Discharge Diagnoses   Active Problems:    Atrial fibrillation with RVR (H)        Follow-ups Needed After Discharge   Follow-up Appointments     Follow-up and recommended labs and tests       Follow up with Dr. Rodriguez  to evaluate medication change and for hospital   follow- up. The following labs/tests are recommended: BMP, Mg.             Unresulted Labs Ordered in the Past 30 Days of this Admission     No orders found from 3/10/2022 to 4/10/2022.        Discharge Disposition   Discharged to home  Condition at discharge: Stable      Hospital Course   46-year-old female with morbid obesity, CALLY on CPAP, known A. fib with 2 prior PVI ablation procedures, T2DM, HTN, anticoagulated with warfarin for stroke prevention, was admitted on 4/9/2022 with palpitations and symptomatic A. fib with RVR.  She presented to the emergency department where she was in very rapid SVT.  She received IV adenosine and converted from regular SVT into A. fib with RVR.  She was started on diltiazem infusion that was changed to diltiazem 240 mg daily in addition to PTA metoprolol 25 mg twice daily.  Her symptoms improved but her rate remained in the in 100-110s.  On hospital day 2/day of discharge, metoprolol was increased to 50 mg twice daily with improvement in rate to <100s.    The following problems were addressed during her hospitalization:    Afib with RVR: Adenosine x2 and cardioversion x2 failed to convert back to sinus rhythm. Received IV dilt x2 and rate 100s.  Inpatient cardiology was consulted and made medication changes as follows:  - Started Diltiazem 240 mg ER daily  - Metoprolol 25 mg BID was started and increased to 50 mg BID before discharge  - Follow up with outpatient cardiologist within  "1 week of discharge      Subtherapeutic INR:  Pharmacy was consulted.  Warfarin was continued.  Warfarin PTA Regimen: 2.5 mg on Wednesdays and Saturdays;  5 mg all other days.  Received 5 mg dose on Saturday.  INR 1.9 on the day of discharge.  No additional changes were made  - Follow-up in INR clinic       Hypomagnesemia: Received IV magnesium replacement per protocol.  Patient had magnesium supplements at home, but has not been taking in recent weeks.  - Resume once daily oral magnesium  - Check Mg       Elevated TSH, FT4 WNL.  - recheck TSH in 4-6 weeks        HTN: Discontinued hydralazine       CALLY: Continued PTA CPAP.       DM, stable: A1c 6.5 this admission.   Metformin was continued.  She was on a diabetic diet.  - Consider GLP-1 agonist at hospital follow-up with PCP       TBI: Continued PTA gabapentin, oxycodone, tizanidine.  Imitrex was held in the setting of acute arrhythmias.  - Discontinued Imitrex due to arrhythmogenic properties        Consultations This Hospital Stay   CARDIOLOGY IP CONSULT  PHARMACY TO DOSE WARFARIN  DIABETES EDUCATION IP CONSULT    Code Status   Prior       The patient was discussed with Dr. Kalpana Momin MD  Family Medicine Teaching Service  [facility]  [address] [place] [id number]  Phone: [phone]  Fax: [phone]  ______________________________________________________________________    Physical Exam   BP (!) 145/68 (BP Location: Left leg, Patient Position: Sitting)   Pulse 114   Temp 98  F (36.7  C) (Oral)   Resp 18   Ht 1.727 m (5' 8\")   Wt 138.2 kg (304 lb 11.2 oz)   SpO2 96%   BMI 46.33 kg/m    Weight: 304 lbs 11.2 oz     General: pleasant female. No acute distress.  HEENT: MMM.  Wearing sunglasses  Neck: No JVD  Lungs: CTAB  CV: fast rate, irregularly irregular rhythm. No murmurs, rubs, or gallops  Abd: obese, nondistended, BS present  Extr: No edema      Primary Care Physician   DIRK MOSQUERA" JOSHUA    Discharge Orders      Reason for your hospital stay    Atrial fibrillation with RVR (H)     Follow-up and recommended labs and tests     Follow up with Dr. Rodrgiuez  to evaluate medication change and for hospital follow- up. The following labs/tests are recommended: BMP, Mg.     Activity    Your activity upon discharge: activity as tolerated     Diet    Follow this diet upon discharge: Orders Placed This Encounter      Consistent Carbohydrate Diet Moderate Consistent Carb (60 g CHO per Meal) Diet       Significant Results and Procedures   INR 1.90  Magnesium 1.8  Potassium 4.3      Discharge Medications   Discharge Medication List as of 4/10/2022 11:59 AM      START taking these medications    Details   diltiazem ER COATED BEADS (CARDIZEM CD/CARTIA XT) 240 MG 24 hr capsule Take 1 capsule (240 mg) by mouth daily, Disp-30 capsule, R-0, E-Prescribe      metoprolol tartrate (LOPRESSOR) 50 MG tablet Take 1 tablet (50 mg) by mouth 2 times daily, Disp-60 tablet, R-0, E-Prescribe         CONTINUE these medications which have NOT CHANGED    Details   acetaminophen (TYLENOL) 500 MG tablet Take 1,000 mg by mouth every 6 hours, Historical      clobetasol (TEMOVATE) 0.05 % cream [CLOBETASOL (TEMOVATE) 0.05 % CREAM] Apply 1 application topically 2 (two) times a day as needed (eczema). Historical      gabapentin (NEURONTIN) 300 MG capsule Take 300 mg by mouth 3 times daily, Historical      HEMP OIL OR EXTRACT OR OTHER CBD CANNABINOID, NOT MEDICAL CANNABIS, Take 1 capsule by mouth daily, Historical      hydrocortisone 1 % cream [HYDROCORTISONE 1 % CREAM] Apply 1 application topically 2 (two) times a day as needed (itching).       Historical      levonorgestrel (MIRENA) 20 mcg/24 hr (5 years) IUD [LEVONORGESTREL (MIRENA) 20 MCG/24 HR (5 YEARS) IUD] 1 Device by Intrauterine route.Historical      Melatonin 10 MG CAPS Take 10 mg by mouth At Bedtime, Historical      Melatonin 5 MG CHEW Take 5 mg by mouth At Bedtime , Historical       metFORMIN (GLUCOPHAGE-XR) 500 MG 24 hr tablet [METFORMIN (GLUCOPHAGE-XR) 500 MG 24 HR TABLET] Take 1,000 mg by mouth every evening., Historical      ondansetron (ZOFRAN) 4 MG tablet Take 8 mg by mouth every 8 hours as needed for nausea, Historical      tiZANidine (ZANAFLEX) 4 MG tablet Take 4 mg by mouth every 8 hours as needed for muscle spasms, Historical      warfarin (COUMADIN/JANTOVEN) 5 MG tablet Take 2.5-5 mg by mouth See Admin Instructions Take 2.5mg Wednesday and Saturday. Take 5mg all other days, Historical         STOP taking these medications       diltiazem (CARDIZEM) 60 MG tablet Comments:   Reason for Stopping:         hydrALAZINE (APRESOLINE) 25 MG tablet Comments:   Reason for Stopping:         SUMAtriptan (IMITREX) 25 MG tablet Comments:   Reason for Stopping:             Allergies   Allergies   Allergen Reactions     Apixaban Other (See Comments)     Other reaction(s): menorrhagia  Menorrhagia       Morphine (Pf) [Morphine] Other (See Comments)     Heart rate slows and resp slow     Prednisone Palpitations

## 2022-04-23 ENCOUNTER — APPOINTMENT (OUTPATIENT)
Dept: RADIOLOGY | Facility: CLINIC | Age: 47
End: 2022-04-23
Attending: EMERGENCY MEDICINE
Payer: COMMERCIAL

## 2022-04-23 ENCOUNTER — HOSPITAL ENCOUNTER (EMERGENCY)
Facility: CLINIC | Age: 47
Discharge: HOME | End: 2022-04-23
Attending: EMERGENCY MEDICINE | Admitting: EMERGENCY MEDICINE
Payer: COMMERCIAL

## 2022-04-23 VITALS
TEMPERATURE: 98 F | RESPIRATION RATE: 19 BRPM | BODY MASS INDEX: 46.22 KG/M2 | SYSTOLIC BLOOD PRESSURE: 145 MMHG | WEIGHT: 293 LBS | OXYGEN SATURATION: 97 % | DIASTOLIC BLOOD PRESSURE: 79 MMHG | HEART RATE: 57 BPM

## 2022-04-23 DIAGNOSIS — E83.42 HYPOMAGNESEMIA: ICD-10-CM

## 2022-04-23 DIAGNOSIS — R00.1 BRADYCARDIA: ICD-10-CM

## 2022-04-23 DIAGNOSIS — M79.89 SWELLING OF BOTH LOWER EXTREMITIES: ICD-10-CM

## 2022-04-23 LAB
ALBUMIN SERPL-MCNC: 3.5 G/DL (ref 3.5–5)
ALP SERPL-CCNC: 74 U/L (ref 45–120)
ALT SERPL W P-5'-P-CCNC: 23 U/L (ref 0–45)
ANION GAP SERPL CALCULATED.3IONS-SCNC: 11 MMOL/L (ref 5–18)
AST SERPL W P-5'-P-CCNC: 19 U/L (ref 0–40)
ATRIAL RATE - MUSE: 57 BPM
BASOPHILS # BLD AUTO: 0 10E3/UL (ref 0–0.2)
BASOPHILS NFR BLD AUTO: 1 %
BILIRUB DIRECT SERPL-MCNC: 0.2 MG/DL
BILIRUB SERPL-MCNC: 0.6 MG/DL (ref 0–1)
BNP SERPL-MCNC: 201 PG/ML (ref 0–64)
BUN SERPL-MCNC: 10 MG/DL (ref 8–22)
CALCIUM SERPL-MCNC: 9.3 MG/DL (ref 8.5–10.5)
CHLORIDE BLD-SCNC: 105 MMOL/L (ref 98–107)
CO2 SERPL-SCNC: 23 MMOL/L (ref 22–31)
CREAT SERPL-MCNC: 0.64 MG/DL (ref 0.6–1.1)
DIASTOLIC BLOOD PRESSURE - MUSE: NORMAL MMHG
EOSINOPHIL # BLD AUTO: 0.1 10E3/UL (ref 0–0.7)
EOSINOPHIL NFR BLD AUTO: 2 %
ERYTHROCYTE [DISTWIDTH] IN BLOOD BY AUTOMATED COUNT: 13.9 % (ref 10–15)
GFR SERPL CREATININE-BSD FRML MDRD: >90 ML/MIN/1.73M2
GLUCOSE BLD-MCNC: 162 MG/DL (ref 70–125)
HCT VFR BLD AUTO: 31.7 % (ref 35–47)
HGB BLD-MCNC: 10.1 G/DL (ref 11.7–15.7)
HOLD SPECIMEN: NORMAL
IMM GRANULOCYTES # BLD: 0 10E3/UL
IMM GRANULOCYTES NFR BLD: 1 %
INR PPP: 1.56 (ref 0.85–1.15)
INTERPRETATION ECG - MUSE: NORMAL
LYMPHOCYTES # BLD AUTO: 2 10E3/UL (ref 0.8–5.3)
LYMPHOCYTES NFR BLD AUTO: 32 %
MAGNESIUM SERPL-MCNC: 1.6 MG/DL (ref 1.8–2.6)
MCH RBC QN AUTO: 26.6 PG (ref 26.5–33)
MCHC RBC AUTO-ENTMCNC: 31.9 G/DL (ref 31.5–36.5)
MCV RBC AUTO: 83 FL (ref 78–100)
MONOCYTES # BLD AUTO: 0.3 10E3/UL (ref 0–1.3)
MONOCYTES NFR BLD AUTO: 4 %
NEUTROPHILS # BLD AUTO: 3.9 10E3/UL (ref 1.6–8.3)
NEUTROPHILS NFR BLD AUTO: 60 %
NRBC # BLD AUTO: 0 10E3/UL
NRBC BLD AUTO-RTO: 0 /100
P AXIS - MUSE: 72 DEGREES
PLATELET # BLD AUTO: 217 10E3/UL (ref 150–450)
POTASSIUM BLD-SCNC: 3.7 MMOL/L (ref 3.5–5)
PR INTERVAL - MUSE: 164 MS
PROT SERPL-MCNC: 6.7 G/DL (ref 6–8)
QRS DURATION - MUSE: 86 MS
QT - MUSE: 426 MS
QTC - MUSE: 414 MS
R AXIS - MUSE: 44 DEGREES
RBC # BLD AUTO: 3.8 10E6/UL (ref 3.8–5.2)
SODIUM SERPL-SCNC: 139 MMOL/L (ref 136–145)
SYSTOLIC BLOOD PRESSURE - MUSE: NORMAL MMHG
T AXIS - MUSE: 82 DEGREES
TROPONIN I SERPL-MCNC: <0.01 NG/ML (ref 0–0.29)
VENTRICULAR RATE- MUSE: 57 BPM
WBC # BLD AUTO: 6.4 10E3/UL (ref 4–11)

## 2022-04-23 PROCEDURE — 93005 ELECTROCARDIOGRAM TRACING: CPT | Performed by: EMERGENCY MEDICINE

## 2022-04-23 PROCEDURE — 82248 BILIRUBIN DIRECT: CPT | Performed by: EMERGENCY MEDICINE

## 2022-04-23 PROCEDURE — 85025 COMPLETE CBC W/AUTO DIFF WBC: CPT | Performed by: EMERGENCY MEDICINE

## 2022-04-23 PROCEDURE — 71046 X-RAY EXAM CHEST 2 VIEWS: CPT

## 2022-04-23 PROCEDURE — 36415 COLL VENOUS BLD VENIPUNCTURE: CPT | Performed by: EMERGENCY MEDICINE

## 2022-04-23 PROCEDURE — 80053 COMPREHEN METABOLIC PANEL: CPT | Performed by: EMERGENCY MEDICINE

## 2022-04-23 PROCEDURE — 83880 ASSAY OF NATRIURETIC PEPTIDE: CPT | Performed by: EMERGENCY MEDICINE

## 2022-04-23 PROCEDURE — 84484 ASSAY OF TROPONIN QUANT: CPT | Performed by: EMERGENCY MEDICINE

## 2022-04-23 PROCEDURE — 99285 EMERGENCY DEPT VISIT HI MDM: CPT | Mod: 25

## 2022-04-23 PROCEDURE — 85610 PROTHROMBIN TIME: CPT | Performed by: EMERGENCY MEDICINE

## 2022-04-23 PROCEDURE — 83735 ASSAY OF MAGNESIUM: CPT | Performed by: EMERGENCY MEDICINE

## 2022-04-23 RX ORDER — MAGNESIUM OXIDE 400 MG/1
400 TABLET ORAL DAILY
Qty: 10 TABLET | Refills: 0 | Status: SHIPPED | OUTPATIENT
Start: 2022-04-23 | End: 2022-06-07

## 2022-04-23 RX ORDER — FUROSEMIDE 20 MG
20 TABLET ORAL DAILY
Qty: 21 TABLET | Refills: 0 | Status: SHIPPED | OUTPATIENT
Start: 2022-04-23 | End: 2022-06-07

## 2022-04-23 NOTE — ED TRIAGE NOTES
"Patient here with chest \"pinching\" sensation, was discharged one week ago for SVT and A Fib, now taking Metoprolol and Diltiazem.  Having issues with heart rate being too low and so having to hold the metoprolol, having swelling in bilateral legs and feet.  Gely Macias RN.......4/23/2022 12:43 PM  "

## 2022-04-23 NOTE — ED PROVIDER NOTES
EMERGENCY DEPARTMENT ENCOUNTER      NAME: Milagro Thomas  AGE: 46 year old female  YOB: 1975  MRN: [id number]  EVALUATION DATE & TIME: 2022 12:36 PM    PCP: Misha Stevenson    ED PROVIDER: Rony Garcia M.D.      Chief Complaint   Patient presents with     Chest Pain     Leg Swelling       FINAL IMPRESSION:  1. Swelling of both lower extremities    2. Bradycardia    3. Hypomagnesemia          ED COURSE & MEDICAL DECISION MAKIN year old female presents to the Emergency Department for evaluation of chest pain and leg swelling.  She has a history of recent admission for SVT/atrial fibrillation, recently started on metoprolol and diltiazem.  She presents with episodic bradycardia also with dependent lower extremity edema and chest pain.  Her vital signs are stable when she arrives to the ED.  She has mild lower extremity edema on exam but otherwise clear lungs and appropriate oximetry.  Her heart rate is intermittently mildly bradycardic.  Her blood pressure and other vital signs are stable.  Her last use of metoprolol was last evening.  She has not been taking diltiazem since she converted to sinus rhythm.    Patient has some evidence of hypervolemia on exam with lower extremity swelling which is fairly mild.  She also has some mild interstitial edema on her chest x-ray as well as new cardiomegaly which does not appear to have been documented previously.  Her most recent echocardiogram out of system was at least a year ago.  I think she might benefit from initiation of Lasix in the short-term to help with edema.  She is vitally stable and oxygen saturations are normal so do not think she would need to be admitted necessarily for management.  She is already well connected with [facility] cardiology and is going to follow-up there as soon as possible.  I discussed holding her metoprolol for heart rate less than 60 and recommended that she further reduces her dose if she is going to continue  taking it at all.  She will be started on Lasix 20 mg.  Additional magnesium supplementation was recommended due to her hypomagnesemia.    Patient was feeling well on recheck, she was anxious to discharge.  I discussed the results of her work-up here and my recommendations for Lasix and dose reduction in her metoprolol and she was in agreement with this.  She is going to contact her cardiology providers on Monday.  ED return precautions were discussed.  Patient was discharged in good condition.      1:00 PM I met with the patient to gather history and to perform my initial exam. I discussed the plan for care while in the Emergency Department. PPE (gloves, eye protection, N95 mask) was worn by me during patient encounters while patient wore mask.   2:52 PM Rechecked on the patient and reviewed her work-up      At the conclusion of the encounter I discussed the results of all of the tests and the disposition. The questions were answered. The patient or family acknowledged understanding and was agreeable with the care plan.       MEDICATIONS GIVEN IN THE EMERGENCY:  Medications - No data to display    NEW PRESCRIPTIONS STARTED AT TODAY'S ER VISIT  New Prescriptions    FUROSEMIDE (LASIX) 20 MG TABLET    Take 1 tablet (20 mg) by mouth daily    MAGNESIUM OXIDE (MAG-OX) 400 MG TABLET    Take 1 tablet (400 mg) by mouth daily          =================================================================    HPI    Patient information was obtained from: Patient    Use of : N/A         Milagro Thomas is a 46 year old female with a pertinent history of paroxysmal atrial fibrillation, DM II, TBI, anticoagulated on Warfarin, HTN, obesity, who presents to this ED by walk in for evaluation of chest pain.    Per chart review, patient was admitted 4/9/22-4/10/22 for palpitations and symptomatic A. Fib with RVR. Patient initially presented to ED with rapid SVT and converted to A fib with RVR after IV adenosine. On discharge  "patient was started on Diltiazem 240 mg daily and Metoprolol 50 mg BID. Discharged with follow up with cardiology outpatient.    Patient reports she woke up this morning with bilateral lower extremity swelling extending up to her thighs and notes her heart rate was 40 bpm. Swelling has improved slightly but persisted. Patient does note she will typically get feet/ankle swelling when she is in atrial fibrillation. She additionally notes intermittent \"pinching\" pain to her upper chest that is occasionally located to her right chest, left chest, or bilaterally. Episodes of pain last for several minutes. Patient endorses drinking a lot of fluids recently but does not feel she is urinating as much as she should be. Patient took metoprolol last night but not this morning.     Patient has been seen by cardiology since discharge from hospital and was told to increase metoprolol to 75 mg. She was told that if her heart rate is 50 bpm or below that she should stop taking the medications. Patient states her medications are now working \"too well\" and she has had heart rates of 50 bpm. She discussed this with cardiology and was told to hold diltiazem and take metoprolol 50 mg BID. Patient does take blood thinning medications. She endorses shortness of breath with exertion due to low heart rate.    Patient denies additional medical concerns or complaints at this time.      REVIEW OF SYSTEMS   All systems reviewed and negative except as noted in HPI.    PAST MEDICAL HISTORY:  Past Medical History:   Diagnosis Date     Essential hypertension      Morbid obesity (H)      CALLY (obstructive sleep apnea)      Paroxysmal atrial fibrillation (H)     s/p ablation x2     TBI (traumatic brain injury) (H)      Type 2 diabetes mellitus without complication, without long-term current use of insulin (H)        PAST SURGICAL HISTORY:  Past Surgical History:   Procedure Laterality Date      SECTION      x2     CHOLECYSTECTOMY       FOOT " SURGERY      bone spur and reconstruction           CURRENT MEDICATIONS:    No current facility-administered medications for this encounter.     Current Outpatient Medications   Medication     furosemide (LASIX) 20 MG tablet     magnesium oxide (MAG-OX) 400 MG tablet     acetaminophen (TYLENOL) 500 MG tablet     clobetasol (TEMOVATE) 0.05 % cream     diltiazem ER COATED BEADS (CARDIZEM CD/CARTIA XT) 240 MG 24 hr capsule     gabapentin (NEURONTIN) 300 MG capsule     HEMP OIL OR EXTRACT OR OTHER CBD CANNABINOID, NOT MEDICAL CANNABIS,     hydrocortisone 1 % cream     levonorgestrel (MIRENA) 20 mcg/24 hr (5 years) IUD     Melatonin 10 MG CAPS     Melatonin 5 MG CHEW     metFORMIN (GLUCOPHAGE-XR) 500 MG 24 hr tablet     metoprolol tartrate (LOPRESSOR) 50 MG tablet     ondansetron (ZOFRAN) 4 MG tablet     tiZANidine (ZANAFLEX) 4 MG tablet     warfarin (COUMADIN/JANTOVEN) 5 MG tablet         ALLERGIES:  Allergies   Allergen Reactions     Apixaban Other (See Comments)     Other reaction(s): menorrhagia  Menorrhagia       Morphine (Pf) [Morphine] Other (See Comments)     Heart rate slows and resp slow     Prednisone Palpitations       FAMILY HISTORY:  Family History   Problem Relation Age of Onset     Diabetes Father      CABG Father 50.00     Atrial fibrillation Father      Sleep Apnea Father      Skin Cancer Father      Diabetes Maternal Aunt      No Known Problems Mother      No Known Problems Son        SOCIAL HISTORY:   Social History     Socioeconomic History     Marital status:      Number of children: 2   Tobacco Use     Smoking status: Former Smoker     Types: Cigarettes, Cigarettes     Smokeless tobacco: Never Used   Substance and Sexual Activity     Alcohol use: No     Drug use: No     Sexual activity: Yes     Partners: Male     Birth control/protection: Implant       VITALS:  BP (!) 147/73   Pulse 62   Temp 98  F (36.7  C) (Oral)   Resp 18   Wt 137.9 kg (304 lb)   SpO2 97%   BMI 46.22 kg/m       PHYSICAL EXAM    Constitutional: Obese middle-age female patient, sitting up in bed, no distress  HENT: Normocephalic, Atraumatic. Neck Supple.  Eyes: EOMI, Conjunctiva normal.  Respiratory: Breathing comfortably on room air. Speaks full sentences easily. Lungs clear to ascultation.  Cardiovascular: Normal heart rate, Regular rhythm. Mild bilateral lower extremity pitting edema.  Abdomen: Soft, nontender  Musculoskeletal: Good range of motion in all major joints. No major deformities noted. No lower extremity erythema or warmth.  Integument: Warm, Dry.  Neurologic: Alert & awake, Normal motor function, Normal sensory function, No focal deficits noted.   Psychiatric: Cooperative. Affect appropriate.     LAB:  All pertinent labs reviewed and interpreted.  Labs Ordered and Resulted from Time of ED Arrival to Time of ED Departure   BASIC METABOLIC PANEL - Abnormal       Result Value    Sodium 139      Potassium 3.7      Chloride 105      Carbon Dioxide (CO2) 23      Anion Gap 11      Urea Nitrogen 10      Creatinine 0.64      Calcium 9.3      Glucose 162 (*)     GFR Estimate >90     B-TYPE NATRIURETIC PEPTIDE ([facility] ONLY) - Abnormal     (*)    CBC WITH PLATELETS AND DIFFERENTIAL - Abnormal    WBC Count 6.4      RBC Count 3.80      Hemoglobin 10.1 (*)     Hematocrit 31.7 (*)     MCV 83      MCH 26.6      MCHC 31.9      RDW 13.9      Platelet Count 217      % Neutrophils 60      % Lymphocytes 32      % Monocytes 4      % Eosinophils 2      % Basophils 1      % Immature Granulocytes 1      NRBCs per 100 WBC 0      Absolute Neutrophils 3.9      Absolute Lymphocytes 2.0      Absolute Monocytes 0.3      Absolute Eosinophils 0.1      Absolute Basophils 0.0      Absolute Immature Granulocytes 0.0      Absolute NRBCs 0.0     INR - Abnormal    INR 1.56 (*)    MAGNESIUM - Abnormal    Magnesium 1.6 (*)    HEPATIC FUNCTION PANEL - Normal    Bilirubin Total 0.6      Bilirubin Direct 0.2      Protein Total 6.7       Albumin 3.5      Alkaline Phosphatase 74      AST 19      ALT 23     TROPONIN I - Normal    Troponin I <0.01         RADIOLOGY:  Reviewed all pertinent imaging. Please see official radiology report.  Chest XR,  PA & LAT   Preliminary Result   IMPRESSION: Cardiomegaly with pulmonary vascular congestion. Probable interstitial pulmonary edema. No focal infiltrate or pleural effusion.             EKG:    Performed at: 1243    Impression: Sinus bradycardia    Rate: 57 ms  Rhythm: Sinus  Axis: 44  MI Interval: 164 ms  QRS Interval: 86 ms  QTc Interval: 414 ms  ST Changes: None  Comparison: 4/9/22; Sinus rhythm has replaced atrial fibrillation. Vent rate decreased by 76 bpm; ST no longer depressed in inferior leads or anterolateral leads.    I have independently reviewed and interpreted the EKG(s) documented above.    I, Brianda Mclean, am serving as a scribe to document services personally performed by Dr. Rony Garcia, based on my observation and the provider's statements to me. I, Rony Garcia MD attest that Brianda Mclean is acting in a scribe capacity, has observed my performance of the services and has documented them in accordance with my direction.    Rony Garcia M.D.  Emergency Medicine  [facility] EMERGENCY ROOM  [address] [facility] [id number] [phone]  Dept: [phone]     Rony Garcia MD  04/23/22 9057

## 2022-04-23 NOTE — ED PROVIDER NOTES
EMERGENCY DEPARTMENT SIGN OUT NOTE        ED COURSE AND MEDICAL DECISION MAKING  Patient was signed out to me by Dr Yuval Garcia at 2:43 PM    In brief, Milagro Thomas is a 46 year old female who initially presented with bilateral lower extremity edema that onset this morning. The swelling spread into her upper thighs and she additional noted a decreased heart rate of 40 BPM. Upon initial encounter, patient's swelling had somewhat improved. She further endorses in occasional chest pinching to the bilateral upper chest. Patient notes a history of a-fib that has associated symptoms of feet/ankle swelling. She reports increased hydration but decreased urine output. She took prescribed metoprolol 4/22 but has not had any today. No other complaints at this time.      At time of sign out, disposition was pending ***    FINAL IMPRESSION    1. Localized swelling of left lower extremity        ED MEDS  Medications - No data to display    LAB  Labs Ordered and Resulted from Time of ED Arrival to Time of ED Departure   BASIC METABOLIC PANEL - Abnormal       Result Value    Sodium 139      Potassium 3.7      Chloride 105      Carbon Dioxide (CO2) 23      Anion Gap 11      Urea Nitrogen 10      Creatinine 0.64      Calcium 9.3      Glucose 162 (*)     GFR Estimate >90     B-TYPE NATRIURETIC PEPTIDE ([facility] ONLY) - Abnormal     (*)    CBC WITH PLATELETS AND DIFFERENTIAL - Abnormal    WBC Count 6.4      RBC Count 3.80      Hemoglobin 10.1 (*)     Hematocrit 31.7 (*)     MCV 83      MCH 26.6      MCHC 31.9      RDW 13.9      Platelet Count 217      % Neutrophils 60      % Lymphocytes 32      % Monocytes 4      % Eosinophils 2      % Basophils 1      % Immature Granulocytes 1      NRBCs per 100 WBC 0      Absolute Neutrophils 3.9      Absolute Lymphocytes 2.0      Absolute Monocytes 0.3      Absolute Eosinophils 0.1      Absolute Basophils 0.0      Absolute Immature Granulocytes 0.0      Absolute NRBCs 0.0     HEPATIC  FUNCTION PANEL - Normal    Bilirubin Total 0.6      Bilirubin Direct 0.2      Protein Total 6.7      Albumin 3.5      Alkaline Phosphatase 74      AST 19      ALT 23     TROPONIN I - Normal    Troponin I <0.01     INR   MAGNESIUM       EKG  ***    RADIOLOGY    Chest XR,  PA & LAT    (Results Pending)       DISCHARGE MEDS  New Prescriptions    No medications on file         Mark Amado MD  Emergency Medicine  [facility] EMERGENCY ROOM  [facility] [id number] [phone]

## 2022-04-23 NOTE — DISCHARGE INSTRUCTIONS
You were seen today in the emergency department at [facility] for lower extremity swelling and low heart rate.  Your evaluation today looks stable and reassuring.  We do note that your heart rate has been intermittently low.  We think at this point you should hold your metoprolol for any heart rate less than 60, and you could probably further reduce your dose to at most 25 mg once a day, but again would not recommend taking it unless your heart rate is sustained above 60.  Your x-ray and exam look like you are accumulating fluid potentially related to the heart.  We are going to start you on a low-dose of Lasix to help encourage fluid output by the kidneys and you need to follow-up things closely with your cardiologist.  The next step in outpatient management might be to get a echocardiogram to further evaluate your heart.     We also note that your magnesium levels are slightly low here.  We are going to prescribe you an additional tablet of magnesium daily that you should take for the next 10 days and plan to get your levels rechecked in clinic in the near future.  We also noted that your INR remains somewhat low at 1.5.  Please make sure you are still taking your Coumadin daily and discuss this with your anticoagulation clinic as soon as possible.  Please plan to follow-up closely with Dr. Rodriguez with [facility] to discuss any ongoing symptoms and to clarify your medication regimen going forward.

## 2022-06-07 ENCOUNTER — HOSPITAL ENCOUNTER (INPATIENT)
Facility: CLINIC | Age: 47
LOS: 1 days | Discharge: HOME | DRG: 309 | End: 2022-06-09
Attending: EMERGENCY MEDICINE | Admitting: HOSPITALIST
Payer: COMMERCIAL

## 2022-06-07 DIAGNOSIS — I47.10 SVT (SUPRAVENTRICULAR TACHYCARDIA) (H): ICD-10-CM

## 2022-06-07 DIAGNOSIS — I48.0 PAROXYSMAL ATRIAL FIBRILLATION (H): Primary | ICD-10-CM

## 2022-06-07 DIAGNOSIS — I48.91 ATRIAL FIBRILLATION WITH RVR (H): ICD-10-CM

## 2022-06-07 PROBLEM — M79.604 ACUTE PAIN OF RIGHT LOWER EXTREMITY: Status: ACTIVE | Noted: 2021-12-02

## 2022-06-07 PROBLEM — F32.1 CURRENT MODERATE EPISODE OF MAJOR DEPRESSIVE DISORDER (H): Status: ACTIVE | Noted: 2020-03-05

## 2022-06-07 PROBLEM — Z79.01 ANTICOAGULATION MONITORING, INR RANGE 2-3: Status: ACTIVE | Noted: 2019-03-18

## 2022-06-07 PROBLEM — G44.319 ACUTE POST-TRAUMATIC HEADACHE, NOT INTRACTABLE: Status: ACTIVE | Noted: 2021-12-02

## 2022-06-07 PROBLEM — S20.211A CONTUSION OF RIGHT CHEST WALL: Status: ACTIVE | Noted: 2021-11-07

## 2022-06-07 PROBLEM — G44.86 HEADACHE, CERVICOGENIC: Status: ACTIVE | Noted: 2021-12-06

## 2022-06-07 PROBLEM — G43.711 CHRONIC MIGRAINE WITHOUT AURA, WITH INTRACTABLE MIGRAINE, SO STATED, WITH STATUS MIGRAINOSUS: Status: ACTIVE | Noted: 2022-03-18

## 2022-06-07 PROBLEM — S39.012D STRAIN OF MUSCLE, FASCIA AND TENDON OF LOWER BACK, SUBSEQUENT ENCOUNTER: Status: ACTIVE | Noted: 2021-12-06

## 2022-06-07 PROBLEM — S06.360S: Status: ACTIVE | Noted: 2021-12-06

## 2022-06-07 PROBLEM — R94.2 ABNORMAL PFTS (PULMONARY FUNCTION TESTS): Status: ACTIVE | Noted: 2021-07-02

## 2022-06-07 PROBLEM — S13.4XXD: Status: ACTIVE | Noted: 2021-12-06
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
## 2022-06-07 PROBLEM — L30.1 DYSHIDROTIC ECZEMA: Status: ACTIVE | Noted: 2017-06-01

## 2022-06-07 LAB
ANION GAP SERPL CALCULATED.3IONS-SCNC: 13 MMOL/L (ref 5–18)
APTT PPP: 35 SECONDS (ref 22–38)
BASOPHILS # BLD AUTO: 0 10E3/UL (ref 0–0.2)
BASOPHILS NFR BLD AUTO: 0 %
BUN SERPL-MCNC: 12 MG/DL (ref 8–22)
CALCIUM SERPL-MCNC: 9.8 MG/DL (ref 8.5–10.5)
CHLORIDE BLD-SCNC: 105 MMOL/L (ref 98–107)
CO2 SERPL-SCNC: 23 MMOL/L (ref 22–31)
CREAT SERPL-MCNC: 0.7 MG/DL (ref 0.6–1.1)
EOSINOPHIL # BLD AUTO: 0.1 10E3/UL (ref 0–0.7)
EOSINOPHIL NFR BLD AUTO: 1 %
ERYTHROCYTE [DISTWIDTH] IN BLOOD BY AUTOMATED COUNT: 13.8 % (ref 10–15)
GFR SERPL CREATININE-BSD FRML MDRD: >90 ML/MIN/1.73M2
GLUCOSE BLD-MCNC: 177 MG/DL (ref 70–125)
HCT VFR BLD AUTO: 36.9 % (ref 35–47)
HGB BLD-MCNC: 12 G/DL (ref 11.7–15.7)
IMM GRANULOCYTES # BLD: 0 10E3/UL
IMM GRANULOCYTES NFR BLD: 0 %
INR PPP: 2.21 (ref 0.85–1.15)
LYMPHOCYTES # BLD AUTO: 2.9 10E3/UL (ref 0.8–5.3)
LYMPHOCYTES NFR BLD AUTO: 33 %
MAGNESIUM SERPL-MCNC: 1.6 MG/DL (ref 1.8–2.6)
MCH RBC QN AUTO: 26 PG (ref 26.5–33)
MCHC RBC AUTO-ENTMCNC: 32.5 G/DL (ref 31.5–36.5)
MCV RBC AUTO: 80 FL (ref 78–100)
MONOCYTES # BLD AUTO: 0.6 10E3/UL (ref 0–1.3)
MONOCYTES NFR BLD AUTO: 6 %
NEUTROPHILS # BLD AUTO: 5.2 10E3/UL (ref 1.6–8.3)
NEUTROPHILS NFR BLD AUTO: 60 %
NRBC # BLD AUTO: 0 10E3/UL
NRBC BLD AUTO-RTO: 0 /100
PLATELET # BLD AUTO: 256 10E3/UL (ref 150–450)
POTASSIUM BLD-SCNC: 3.9 MMOL/L (ref 3.5–5)
RBC # BLD AUTO: 4.61 10E6/UL (ref 3.8–5.2)
SARS-COV-2 RNA RESP QL NAA+PROBE: NEGATIVE
SODIUM SERPL-SCNC: 141 MMOL/L (ref 136–145)
TROPONIN I SERPL-MCNC: <0.01 NG/ML (ref 0–0.29)
TSH SERPL DL<=0.005 MIU/L-ACNC: 1.44 UIU/ML (ref 0.3–5)
WBC # BLD AUTO: 8.7 10E3/UL (ref 4–11)

## 2022-06-07 PROCEDURE — 96368 THER/DIAG CONCURRENT INF: CPT

## 2022-06-07 PROCEDURE — 250N000011 HC RX IP 250 OP 636: Performed by: EMERGENCY MEDICINE

## 2022-06-07 PROCEDURE — 250N000009 HC RX 250: Performed by: EMERGENCY MEDICINE

## 2022-06-07 PROCEDURE — 99285 EMERGENCY DEPT VISIT HI MDM: CPT | Mod: 25

## 2022-06-07 PROCEDURE — 36415 COLL VENOUS BLD VENIPUNCTURE: CPT | Performed by: EMERGENCY MEDICINE

## 2022-06-07 PROCEDURE — 96375 TX/PRO/DX INJ NEW DRUG ADDON: CPT

## 2022-06-07 PROCEDURE — 85730 THROMBOPLASTIN TIME PARTIAL: CPT | Performed by: EMERGENCY MEDICINE

## 2022-06-07 PROCEDURE — 258N000003 HC RX IP 258 OP 636: Performed by: EMERGENCY MEDICINE

## 2022-06-07 PROCEDURE — G0378 HOSPITAL OBSERVATION PER HR: HCPCS

## 2022-06-07 PROCEDURE — 84443 ASSAY THYROID STIM HORMONE: CPT | Performed by: EMERGENCY MEDICINE

## 2022-06-07 PROCEDURE — 5A2204Z RESTORATION OF CARDIAC RHYTHM, SINGLE: ICD-10-PCS | Performed by: EMERGENCY MEDICINE

## 2022-06-07 PROCEDURE — 85025 COMPLETE CBC W/AUTO DIFF WBC: CPT | Performed by: EMERGENCY MEDICINE

## 2022-06-07 PROCEDURE — 96376 TX/PRO/DX INJ SAME DRUG ADON: CPT

## 2022-06-07 PROCEDURE — 96365 THER/PROPH/DIAG IV INF INIT: CPT

## 2022-06-07 PROCEDURE — U0005 INFEC AGEN DETEC AMPLI PROBE: HCPCS | Performed by: EMERGENCY MEDICINE

## 2022-06-07 PROCEDURE — C9803 HOPD COVID-19 SPEC COLLECT: HCPCS

## 2022-06-07 PROCEDURE — 99204 OFFICE O/P NEW MOD 45 MIN: CPT | Performed by: HOSPITALIST

## 2022-06-07 PROCEDURE — 85610 PROTHROMBIN TIME: CPT | Performed by: EMERGENCY MEDICINE

## 2022-06-07 PROCEDURE — 93005 ELECTROCARDIOGRAM TRACING: CPT | Performed by: EMERGENCY MEDICINE

## 2022-06-07 PROCEDURE — 82310 ASSAY OF CALCIUM: CPT | Performed by: EMERGENCY MEDICINE

## 2022-06-07 PROCEDURE — 250N000013 HC RX MED GY IP 250 OP 250 PS 637: Performed by: EMERGENCY MEDICINE

## 2022-06-07 PROCEDURE — 96366 THER/PROPH/DIAG IV INF ADDON: CPT

## 2022-06-07 PROCEDURE — 84484 ASSAY OF TROPONIN QUANT: CPT | Performed by: HOSPITALIST

## 2022-06-07 PROCEDURE — 83735 ASSAY OF MAGNESIUM: CPT | Performed by: EMERGENCY MEDICINE

## 2022-06-07 RX ORDER — ADENOSINE 3 MG/ML
12 INJECTION, SOLUTION INTRAVENOUS ONCE
Status: COMPLETED | OUTPATIENT
Start: 2022-06-07 | End: 2022-06-07

## 2022-06-07 RX ORDER — DILTIAZEM HYDROCHLORIDE 240 MG/1
240 CAPSULE, COATED, EXTENDED RELEASE ORAL PRN
Status: ON HOLD | COMMUNITY
End: 2022-06-09

## 2022-06-07 RX ORDER — METOPROLOL SUCCINATE 25 MG/1
12.5 TABLET, EXTENDED RELEASE ORAL DAILY
COMMUNITY
End: 2022-06-09

## 2022-06-07 RX ORDER — METFORMIN HCL 500 MG
1000 TABLET, EXTENDED RELEASE 24 HR ORAL EVERY EVENING
Status: DISCONTINUED | OUTPATIENT
Start: 2022-06-08 | End: 2022-06-08

## 2022-06-07 RX ORDER — DILTIAZEM HCL IN NACL,ISO-OSM 125 MG/125
10 PLASTIC BAG, INJECTION (ML) INTRAVENOUS CONTINUOUS
Status: DISCONTINUED | OUTPATIENT
Start: 2022-06-07 | End: 2022-06-09 | Stop reason: HOSPADM

## 2022-06-07 RX ORDER — MAGNESIUM OXIDE 400 MG/1
400 TABLET ORAL ONCE
Status: COMPLETED | OUTPATIENT
Start: 2022-06-07 | End: 2022-06-07

## 2022-06-07 RX ORDER — TIZANIDINE 2 MG/1
4 TABLET ORAL EVERY 8 HOURS PRN
Status: DISCONTINUED | OUTPATIENT
Start: 2022-06-07 | End: 2022-06-09 | Stop reason: HOSPADM

## 2022-06-07 RX ORDER — DILTIAZEM HYDROCHLORIDE 5 MG/ML
30 INJECTION INTRAVENOUS ONCE
Status: COMPLETED | OUTPATIENT
Start: 2022-06-07 | End: 2022-06-07

## 2022-06-07 RX ORDER — GABAPENTIN 100 MG/1
400 CAPSULE ORAL 2 TIMES DAILY
COMMUNITY

## 2022-06-07 RX ORDER — ADENOSINE 3 MG/ML
6 INJECTION, SOLUTION INTRAVENOUS ONCE
Status: COMPLETED | OUTPATIENT
Start: 2022-06-07 | End: 2022-06-07

## 2022-06-07 RX ORDER — GABAPENTIN 300 MG/1
600 CAPSULE ORAL AT BEDTIME
Status: DISCONTINUED | OUTPATIENT
Start: 2022-06-07 | End: 2022-06-09 | Stop reason: HOSPADM

## 2022-06-07 RX ORDER — MAGNESIUM OXIDE 400 MG/1
400 TABLET ORAL AT BEDTIME
Status: DISCONTINUED | OUTPATIENT
Start: 2022-06-08 | End: 2022-06-09 | Stop reason: HOSPADM

## 2022-06-07 RX ORDER — WARFARIN SODIUM 5 MG/1
5 TABLET ORAL ONCE
Status: COMPLETED | OUTPATIENT
Start: 2022-06-08 | End: 2022-06-08

## 2022-06-07 RX ORDER — GABAPENTIN 400 MG/1
400 CAPSULE ORAL 2 TIMES DAILY
Status: DISCONTINUED | OUTPATIENT
Start: 2022-06-08 | End: 2022-06-09 | Stop reason: HOSPADM

## 2022-06-07 RX ORDER — ACETAMINOPHEN 500 MG
1000 TABLET ORAL EVERY 6 HOURS
Status: DISCONTINUED | OUTPATIENT
Start: 2022-06-07 | End: 2022-06-09 | Stop reason: HOSPADM

## 2022-06-07 RX ORDER — ONDANSETRON 2 MG/ML
4 INJECTION INTRAMUSCULAR; INTRAVENOUS EVERY 6 HOURS PRN
Status: DISCONTINUED | OUTPATIENT
Start: 2022-06-07 | End: 2022-06-09 | Stop reason: HOSPADM

## 2022-06-07 RX ORDER — ONDANSETRON 4 MG/1
4 TABLET, ORALLY DISINTEGRATING ORAL EVERY 6 HOURS PRN
Status: DISCONTINUED | OUTPATIENT
Start: 2022-06-07 | End: 2022-06-09 | Stop reason: HOSPADM

## 2022-06-07 RX ORDER — LIDOCAINE 40 MG/G
CREAM TOPICAL
Status: DISCONTINUED | OUTPATIENT
Start: 2022-06-07 | End: 2022-06-09 | Stop reason: HOSPADM

## 2022-06-07 RX ORDER — ONDANSETRON 4 MG/1
8 TABLET, FILM COATED ORAL EVERY 8 HOURS PRN
Status: DISCONTINUED | OUTPATIENT
Start: 2022-06-07 | End: 2022-06-09 | Stop reason: HOSPADM

## 2022-06-07 RX ADMIN — ADENOSINE 6 MG: 3 INJECTION, SOLUTION INTRAVENOUS at 21:04

## 2022-06-07 RX ADMIN — DILTIAZEM HYDROCHLORIDE 30 MG: 5 INJECTION INTRAVENOUS at 21:27

## 2022-06-07 RX ADMIN — ADENOSINE 12 MG: 3 INJECTION, SOLUTION INTRAVENOUS at 21:08

## 2022-06-07 RX ADMIN — Medication 400 MG: at 22:41

## 2022-06-07 RX ADMIN — AMIODARONE HYDROCHLORIDE 1 MG/MIN: 50 INJECTION, SOLUTION INTRAVENOUS at 22:47

## 2022-06-07 RX ADMIN — AMIODARONE HYDROCHLORIDE 150 MG: 1.5 INJECTION, SOLUTION INTRAVENOUS at 22:13

## 2022-06-07 RX ADMIN — Medication 15 MG/HR: at 23:53

## 2022-06-07 RX ADMIN — ADENOSINE 12 MG: 3 INJECTION, SOLUTION INTRAVENOUS at 21:06

## 2022-06-07 ASSESSMENT — ENCOUNTER SYMPTOMS
DIAPHORESIS: 1
COLOR CHANGE: 0
EYE REDNESS: 0
NECK STIFFNESS: 0
HEADACHES: 0
DIFFICULTY URINATING: 0
ABDOMINAL PAIN: 0
FEVER: 0
CONFUSION: 0
ARTHRALGIAS: 0
SHORTNESS OF BREATH: 0

## 2022-06-08 ENCOUNTER — APPOINTMENT (OUTPATIENT)
Dept: CARDIOLOGY | Facility: CLINIC | Age: 47
DRG: 309 | End: 2022-06-08
Attending: HOSPITALIST
Payer: COMMERCIAL

## 2022-06-08 LAB
ANION GAP SERPL CALCULATED.3IONS-SCNC: 11 MMOL/L (ref 5–18)
BUN SERPL-MCNC: 8 MG/DL (ref 8–22)
CALCIUM SERPL-MCNC: 9.8 MG/DL (ref 8.5–10.5)
CHLORIDE BLD-SCNC: 106 MMOL/L (ref 98–107)
CO2 SERPL-SCNC: 24 MMOL/L (ref 22–31)
CREAT SERPL-MCNC: 0.65 MG/DL (ref 0.6–1.1)
GFR SERPL CREATININE-BSD FRML MDRD: >90 ML/MIN/1.73M2
GLUCOSE BLD-MCNC: 113 MG/DL (ref 70–125)
INR PPP: 2.24 (ref 0.85–1.15)
LVEF ECHO: NORMAL
MAGNESIUM SERPL-MCNC: 1.8 MG/DL (ref 1.8–2.6)
POTASSIUM BLD-SCNC: 3.8 MMOL/L (ref 3.5–5)
SODIUM SERPL-SCNC: 141 MMOL/L (ref 136–145)
TROPONIN I SERPL-MCNC: 0.04 NG/ML (ref 0–0.29)

## 2022-06-08 PROCEDURE — 85610 PROTHROMBIN TIME: CPT | Performed by: HOSPITALIST

## 2022-06-08 PROCEDURE — 999N000208 ECHOCARDIOGRAM COMPLETE

## 2022-06-08 PROCEDURE — 93306 TTE W/DOPPLER COMPLETE: CPT | Mod: 26 | Performed by: INTERNAL MEDICINE

## 2022-06-08 PROCEDURE — 84484 ASSAY OF TROPONIN QUANT: CPT | Performed by: HOSPITALIST

## 2022-06-08 PROCEDURE — 255N000002 HC RX 255 OP 636: Performed by: HOSPITALIST

## 2022-06-08 PROCEDURE — G0378 HOSPITAL OBSERVATION PER HR: HCPCS

## 2022-06-08 PROCEDURE — 99223 1ST HOSP IP/OBS HIGH 75: CPT | Performed by: INTERNAL MEDICINE

## 2022-06-08 PROCEDURE — 250N000009 HC RX 250: Performed by: EMERGENCY MEDICINE

## 2022-06-08 PROCEDURE — 250N000013 HC RX MED GY IP 250 OP 250 PS 637: Performed by: INTERNAL MEDICINE

## 2022-06-08 PROCEDURE — 83735 ASSAY OF MAGNESIUM: CPT | Performed by: HOSPITALIST

## 2022-06-08 PROCEDURE — 80048 BASIC METABOLIC PNL TOTAL CA: CPT | Performed by: HOSPITALIST

## 2022-06-08 PROCEDURE — 36415 COLL VENOUS BLD VENIPUNCTURE: CPT | Performed by: HOSPITALIST

## 2022-06-08 PROCEDURE — 120N000013 HC R&B IMCU

## 2022-06-08 PROCEDURE — 99233 SBSQ HOSP IP/OBS HIGH 50: CPT | Performed by: INTERNAL MEDICINE

## 2022-06-08 PROCEDURE — 250N000011 HC RX IP 250 OP 636: Performed by: INTERNAL MEDICINE

## 2022-06-08 PROCEDURE — 250N000013 HC RX MED GY IP 250 OP 250 PS 637: Performed by: HOSPITALIST

## 2022-06-08 RX ORDER — AMIODARONE HYDROCHLORIDE 200 MG/1
200 TABLET ORAL 2 TIMES DAILY
Status: DISCONTINUED | OUTPATIENT
Start: 2022-06-15 | End: 2022-06-09 | Stop reason: HOSPADM

## 2022-06-08 RX ORDER — DIPHENHYDRAMINE HYDROCHLORIDE 50 MG/ML
50 INJECTION INTRAMUSCULAR; INTRAVENOUS ONCE
Status: COMPLETED | OUTPATIENT
Start: 2022-06-08 | End: 2022-06-08

## 2022-06-08 RX ORDER — METFORMIN HCL 500 MG
1000 TABLET, EXTENDED RELEASE 24 HR ORAL EVERY EVENING
Status: DISCONTINUED | OUTPATIENT
Start: 2022-06-08 | End: 2022-06-09 | Stop reason: HOSPADM

## 2022-06-08 RX ORDER — WARFARIN SODIUM 5 MG/1
5 TABLET ORAL
Status: COMPLETED | OUTPATIENT
Start: 2022-06-08 | End: 2022-06-08

## 2022-06-08 RX ORDER — METOPROLOL TARTRATE 50 MG
50 TABLET ORAL 2 TIMES DAILY
Status: DISCONTINUED | OUTPATIENT
Start: 2022-06-08 | End: 2022-06-09 | Stop reason: HOSPADM

## 2022-06-08 RX ORDER — AMIODARONE HYDROCHLORIDE 200 MG/1
200 TABLET ORAL
Status: DISCONTINUED | OUTPATIENT
Start: 2022-06-08 | End: 2022-06-09 | Stop reason: HOSPADM

## 2022-06-08 RX ADMIN — AMIODARONE HYDROCHLORIDE 200 MG: 200 TABLET ORAL at 17:23

## 2022-06-08 RX ADMIN — Medication 10 MG: at 00:29

## 2022-06-08 RX ADMIN — AMIODARONE HYDROCHLORIDE 200 MG: 200 TABLET ORAL at 10:30

## 2022-06-08 RX ADMIN — Medication 400 MG: at 00:24

## 2022-06-08 RX ADMIN — HYDROMORPHONE HYDROCHLORIDE 1 MG: 1 INJECTION, SOLUTION INTRAMUSCULAR; INTRAVENOUS; SUBCUTANEOUS at 10:23

## 2022-06-08 RX ADMIN — AMIODARONE HYDROCHLORIDE 200 MG: 200 TABLET ORAL at 23:40

## 2022-06-08 RX ADMIN — GABAPENTIN 600 MG: 300 CAPSULE ORAL at 21:30

## 2022-06-08 RX ADMIN — METOPROLOL SUCCINATE 12.5 MG: 25 TABLET, EXTENDED RELEASE ORAL at 08:01

## 2022-06-08 RX ADMIN — PERFLUTREN 3 ML: 6.52 INJECTION, SUSPENSION INTRAVENOUS at 09:00

## 2022-06-08 RX ADMIN — ACETAMINOPHEN 1000 MG: 500 TABLET, FILM COATED ORAL at 23:41

## 2022-06-08 RX ADMIN — ACETAMINOPHEN 1000 MG: 500 TABLET, FILM COATED ORAL at 00:23

## 2022-06-08 RX ADMIN — WARFARIN SODIUM 5 MG: 5 TABLET ORAL at 00:24

## 2022-06-08 RX ADMIN — ACETAMINOPHEN 1000 MG: 500 TABLET, FILM COATED ORAL at 11:12

## 2022-06-08 RX ADMIN — GABAPENTIN 400 MG: 400 CAPSULE ORAL at 08:01

## 2022-06-08 RX ADMIN — ACETAMINOPHEN 1000 MG: 500 TABLET, FILM COATED ORAL at 17:23

## 2022-06-08 RX ADMIN — GABAPENTIN 600 MG: 300 CAPSULE ORAL at 00:23

## 2022-06-08 RX ADMIN — ACETAMINOPHEN 1000 MG: 500 TABLET, FILM COATED ORAL at 06:21

## 2022-06-08 RX ADMIN — METFORMIN HYDROCHLORIDE 1000 MG: 500 TABLET, EXTENDED RELEASE ORAL at 00:29

## 2022-06-08 RX ADMIN — METFORMIN HYDROCHLORIDE 1000 MG: 500 TABLET, EXTENDED RELEASE ORAL at 21:38

## 2022-06-08 RX ADMIN — TIZANIDINE 4 MG: 2 TABLET ORAL at 21:30

## 2022-06-08 RX ADMIN — METOPROLOL TARTRATE 50 MG: 50 TABLET, FILM COATED ORAL at 21:30

## 2022-06-08 RX ADMIN — WARFARIN SODIUM 5 MG: 5 TABLET ORAL at 17:23

## 2022-06-08 RX ADMIN — Medication 400 MG: at 21:30

## 2022-06-08 RX ADMIN — Medication 10 MG: at 21:30

## 2022-06-08 RX ADMIN — Medication 15 MG/HR: at 08:24

## 2022-06-08 RX ADMIN — GABAPENTIN 400 MG: 400 CAPSULE ORAL at 15:06

## 2022-06-08 RX ADMIN — METOPROLOL TARTRATE 50 MG: 50 TABLET, FILM COATED ORAL at 10:30

## 2022-06-08 RX ADMIN — DIPHENHYDRAMINE HYDROCHLORIDE 50 MG: 50 INJECTION, SOLUTION INTRAMUSCULAR; INTRAVENOUS at 10:22

## 2022-06-08 ASSESSMENT — ACTIVITIES OF DAILY LIVING (ADL)
DIFFICULTY_COMMUNICATING: NO
ADLS_ACUITY_SCORE: 35
HEARING_DIFFICULTY_OR_DEAF: NO
ADLS_ACUITY_SCORE: 23
COMMUNICATION: DIFFICULTY SPEAKING
CHANGE_IN_FUNCTIONAL_STATUS_SINCE_ONSET_OF_CURRENT_ILLNESS/INJURY: NO
WEAR_GLASSES_OR_BLIND: YES
DRESSING/BATHING_DIFFICULTY: NO
WALKING_OR_CLIMBING_STAIRS_DIFFICULTY: NO
WEAR_GLASSES_OR_BLIND: YES
FALL_HISTORY_WITHIN_LAST_SIX_MONTHS: NO
WALKING_OR_CLIMBING_STAIRS_DIFFICULTY: NO
DIFFICULTY_EATING/SWALLOWING: NO
CHANGE_IN_FUNCTIONAL_STATUS_SINCE_ONSET_OF_CURRENT_ILLNESS/INJURY: NO
ADLS_ACUITY_SCORE: 23
DRESSING/BATHING_DIFFICULTY: NO
HEARING_DIFFICULTY_OR_DEAF: NO
DOING_ERRANDS_INDEPENDENTLY_DIFFICULTY: YES
DOING_ERRANDS_INDEPENDENTLY_DIFFICULTY: YES
ADLS_ACUITY_SCORE: 35
VISION_MANAGEMENT: GLASSES
CONCENTRATING,_REMEMBERING_OR_MAKING_DECISIONS_DIFFICULTY: YES
DIFFICULTY_COMMUNICATING: YES
ADLS_ACUITY_SCORE: 23
ADLS_ACUITY_SCORE: 35
DEPENDENT_IADLS:: TRANSPORTATION
ADLS_ACUITY_SCORE: 35
FALL_HISTORY_WITHIN_LAST_SIX_MONTHS: NO
ADLS_ACUITY_SCORE: 23
DIFFICULTY_EATING/SWALLOWING: NO
TOILETING_ISSUES: NO
CONCENTRATING,_REMEMBERING_OR_MAKING_DECISIONS_DIFFICULTY: YES
TOILETING_ISSUES: NO
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
## 2022-06-08 NOTE — ED TRIAGE NOTES
Patient presents to the ED with chest pressure/feeling like her heart racing since 6:30pm. Took 240 mg of dilt PO. Chest pressure rated 6/10.     Triage Assessment     Row Name 06/07/22 2005       Triage Assessment (Adult)    Airway WDL WDL       Respiratory WDL    Respiratory WDL WDL       Skin Circulation/Temperature WDL    Skin Circulation/Temperature WDL WDL       Cardiac WDL    Cardiac WDL X  c/o tachycardia and chest pressure       Peripheral/Neurovascular WDL    Peripheral Neurovascular WDL WDL       Cognitive/Neuro/Behavioral WDL    Cognitive/Neuro/Behavioral WDL WDL
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
## 2022-06-08 NOTE — ED NOTES
Patient reports feelings of palpitations around 630pm tonight. Feelings chest pressure and burning in throat. History of 2 ablations due to SVT history (diagnosed 4-5 years ago). Took 240 mg of diltiazem PRN at 630pm with no relief. States she took her daily medication of metoprolol earlier today. Mild shortness of breath.

## 2022-06-08 NOTE — PHARMACY-ANTICOAGULATION SERVICE
Clinical Pharmacy - Warfarin Dosing Consult     Pharmacy has been consulted to manage this patient s warfarin therapy.  Indication: Atrial Fibrillation  Therapy Goal: INR 2-3  Provider/Team: YASMANI  Warfarin Prior to Admission: Yes  Warfarin PTA Regimen: 5 mg qday  Recent documented change in oral intake/nutrition: Unknown    INR   Date Value Ref Range Status   06/07/2022 2.21 (H) 0.85 - 1.15 Final   04/23/2022 1.56 (H) 0.85 - 1.15 Final       Recommend warfarin 5 mg today.(for Tuesday missed dose)  Pharmacy will monitor Milagrosukhi Vincentte daily and order warfarin doses to achieve specified goal.      Please contact pharmacy as soon as possible if the warfarin needs to be held for a procedure or if the warfarin goals change.

## 2022-06-08 NOTE — CONSULTS
` [facility] [address], [place] [address]   [email or web address]   OFFICE: [phone]        Impression and Plan     1.  Atrial fibrillation.  Milagro has a history of atrial fibrillation and she has undergone two prior PVI ablation procedures (2019 and 2021).  She had been on apixaban though now maintained on warfarin vis-à-vis previous gynecologic bleeding issues.  INR 2.24 this morning.    Continue metoprolol, but will convert to metoprolol tartrate to allow for more rapid upward titration of therapy as needed.    Continue amiodarone and will initiate enteral therapy.    Amiodarone 200 mg 3 times daily x7 days, then 200 mg twice daily thereafter.    Discontinue IV amiodarone after third dose of enteral therapy.    Transition off IV diltiazem as able.    Continue warfarin.    Echocardiogram has been ordered and will follow up results accordingly.    If no spontaneous conversion with aforementioned measures, will pursue attempt at direct-current cardioversion tomorrow, 9 June 2022.    2.  SVT.  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    AV conduction modification medical therapy as per problem #1.    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    Primary Cardiologist: Dr. Jae Brasher    History of Present Illness    Milagro Thomas is a 46 year old female with history of atrial fibrillation.  She also has history of SVT.  Milagro underwent prior PVI ablation procedures x2 in 2019 and 2021.    Air now presents with chest discomfort that occurred at approximately 1830 p.m. on day of admission.  She had noted a subjective increase in her heart rate.  She had taken 240 mg diltiazem which she  previously had been advised to take on a as needed basis.  On presentation she was noted to have a regular SVT with heart rate approaching 200 bpm.  She was given adenosine and documentation indicates that she had very transient sinus rhythm and then transition to atrial fibrillation.  She was initiated on amiodarone and diltiazem.    Presently, she is more comfortable.  States her breathing is fairly comfortable.  Denies chest pain.    Further review of systems is otherwise negative/noncontributory (medical record and 13 point review of systems reviewed as well and pertinent positives noted).    Cardiac Diagnostics   Telemetry (personally reviewed): Atrial fibrillation.    Echocardiogram 2019:  1. Normal left ventricular size and systolic performance with ejection fraction of 60 to 65%.  2. No significant valvular heart disease.  3. Normal right ventricular size and systolic performance.  4. Mild left atrial enlargement.  Right atrium of normal dimension.    Twelve-lead ECG (personally reviewed) 2022 at 2013: Supraventricular tachycardia with heart rate of 199 bpm.  Nonspecific T wave changes.    Twelve-lead ECG (personally reviewed) 2022: Sinus rhythm with heart rate of 57 bpm.    Twelve-lead ECG (personally reviewed) 2022: Atrial fibrillation with heart rate of 133 bpm.    Medical History  Surgical History   Past Medical History:   Diagnosis Date     Essential hypertension      Morbid obesity (H)      CALLY (obstructive sleep apnea)      Paroxysmal atrial fibrillation (H)     s/p ablation x2     TBI (traumatic brain injury) (H)      Type 2 diabetes mellitus without complication, without long-term current use of insulin (H)       Past Surgical History:   Procedure Laterality Date      SECTION      x2     CHOLECYSTECTOMY       FOOT SURGERY      bone spur and reconstruction          Family History/Social History/Risk Factors   Patient does not smoke.  Family history reviewed,  "and   Family History   Problem Relation Age of Onset     Diabetes Father      CABG Father 50.00     Atrial fibrillation Father      Sleep Apnea Father      Skin Cancer Father      Diabetes Maternal Aunt      No Known Problems Mother      No Known Problems Son           Physical Examination   /79   Pulse 112   Temp 98.2  F (36.8  C) (Oral)   Resp 13   Ht 1.727 m (5' 8\")   Wt 137.9 kg (304 lb)   SpO2 95%   BMI 46.22 kg/m    Wt Readings from Last 5 Encounters:   06/07/22 137.9 kg (304 lb)   04/23/22 137.9 kg (304 lb)   04/10/22 138.2 kg (304 lb 11.2 oz)   01/24/19 139.7 kg (308 lb)   12/13/17 142 kg (313 lb)     Wt Readings from Last 3 Encounters:   06/07/22 137.9 kg (304 lb)   04/23/22 137.9 kg (304 lb)   04/10/22 138.2 kg (304 lb 11.2 oz)     No intake or output data in the 24 hours ending 06/08/22 0744    The patient is alert and oriented times three. Sclerae are anicteric. Mucosal membranes are moist. Jugular venous pressure is normal. No significant adenopathy/thyromegally appreciated. Lungs are fairly clear with reasonable expansion. On cardiovascular exam, the patient has an irregular S1 and S2.  Heart sounds are distant.  Abdomen is soft and non-tender. Extremities reveal no clubbing, cyanosis,         Imaging     Chest radiograph 23 April 2022:  1. Cardiomegaly with pulmonary vascular congestion.   2. Probable interstitial pulmonary edema.   3. No focal infiltrate or pleural effusion.    Lab Results     Recent Labs   Lab 06/08/22  0453 06/07/22 2022   WBC  --  8.7   HGB  --  12.0   MCV  --  80   PLT  --  256   INR 2.24* 2.21*    141   POTASSIUM 3.8 3.9   CHLORIDE 106 105   CO2 24 23   BUN 8 12   CR 0.65 0.70   ANIONGAP 11 13   ANJU 9.8 9.8    177*     Recent Labs   Lab Test 04/23/22  1337 09/07/19  1353 02/08/19 2016   * 329* 285*     No lab results found in last 7 days.    Invalid input(s): LDLCALC  Lab Results   Component Value Date     06/08/2022    CO2 24 06/08/2022 "    BUN 8 06/08/2022     Lab Results   Component Value Date    WBC 8.7 06/07/2022    HGB 12.0 06/07/2022    HCT 36.9 06/07/2022    MCV 80 06/07/2022     06/07/2022     Lab Results   Component Value Date    CHOL 134 05/23/2017    TRIG 114 05/23/2017    HDL 32 05/23/2017     Recent Labs   Lab Test 05/23/17  0336   CHOL 134   HDL 32*   LDL 79   TRIG 114     Lab Results   Component Value Date    INR 2.24 06/08/2022     Lab Results   Component Value Date    TROPONINI 0.04 06/08/2022    TROPONINI <0.01 06/07/2022    TROPONINI <0.01 04/23/2022     Lab Results   Component Value Date    TSH 1.44 06/07/2022         Current Inpatient Scheduled Medications   Scheduled Meds:    acetaminophen  1,000 mg Oral Q6H     gabapentin  400 mg Oral BID     gabapentin  600 mg Oral At Bedtime     magnesium oxide  400 mg Oral At Bedtime     metFORMIN  1,000 mg Oral QPM     metoprolol succinate ER  12.5 mg Oral Daily     sodium chloride (PF)  3 mL Intracatheter Q8H     Continuous Infusions:    amiodarone 0.5 mg/min (06/08/22 0454)     dilTIAZem HCl-Sodium Chloride 15 mg/hr (06/08/22 0535)     - MEDICATION INSTRUCTIONS -       Warfarin Therapy Reminder            Medications Prior to Admission   Prior to Admission medications    Medication Sig Start Date End Date Taking? Authorizing Provider   acetaminophen (TYLENOL) 500 MG tablet Take 1,000 mg by mouth every 6 hours   Yes Unknown, Entered By History   clobetasol (TEMOVATE) 0.05 % cream [CLOBETASOL (TEMOVATE) 0.05 % CREAM] Apply 1 application topically 2 (two) times a day as needed (eczema).  5/23/17  Yes Provider, Historical   diltiazem ER COATED BEADS (CARTIA XT) 240 MG 24 hr capsule Take 240 mg by mouth as needed (SVT)   Yes Unknown, Entered By History   gabapentin (NEURONTIN) 100 MG capsule Take 400 mg by mouth 2 times daily AM and afternoon   Yes Unknown, Entered By History   gabapentin (NEURONTIN) 300 MG capsule Take 600 mg by mouth At Bedtime   Yes Unknown, Entered By History    HEMP OIL OR EXTRACT OR OTHER CBD CANNABINOID, NOT MEDICAL CANNABIS, Take 1 capsule by mouth At Bedtime   Yes Unknown, Entered By History   hydrocortisone 1 % cream [HYDROCORTISONE 1 % CREAM] Apply 1 application topically 2 (two) times a day as needed (itching).        5/23/17  Yes Provider, Historical   levonorgestrel (MIRENA) 20 mcg/24 hr (5 years) IUD [LEVONORGESTREL (MIRENA) 20 MCG/24 HR (5 YEARS) IUD] 1 Device by Intrauterine route. 2/17/15  Yes Provider, Historical   Magnesium Oxide 250 MG TABS Take 500 mg by mouth At Bedtime   Yes Unknown, Entered By History   Melatonin 10 MG CAPS Take 10 mg by mouth At Bedtime   Yes Unknown, Entered By History   Melatonin 5 MG CHEW Take 5 mg by mouth At Bedtime  1/18/19  Yes Provider, Historical   metFORMIN (GLUCOPHAGE-XR) 500 MG 24 hr tablet [METFORMIN (GLUCOPHAGE-XR) 500 MG 24 HR TABLET] Take 1,000 mg by mouth every evening. 9/7/19  Yes Provider, Historical   metoprolol succinate ER (TOPROL XL) 25 MG 24 hr tablet Take 12.5 mg by mouth daily   Yes Unknown, Entered By History   ondansetron (ZOFRAN) 4 MG tablet Take 8 mg by mouth every 8 hours as needed for nausea   Yes Unknown, Entered By History   tiZANidine (ZANAFLEX) 4 MG tablet Take 4 mg by mouth every 8 hours as needed for muscle spasms   Yes Unknown, Entered By History   warfarin (COUMADIN/JANTOVEN) 5 MG tablet Take 5 mg by mouth daily 9/7/19  Yes Provider, Historical   diltiazem (CARDIZEM) 60 MG tablet [DILTIAZEM (CARDIZEM) 60 MG TABLET] Take 1 tablet (60 mg total) by mouth every 6 (six) hours as needed (rapid heartbeat). 9/8/19 4/10/22  Elio Rodriguez,    hydrALAZINE (APRESOLINE) 25 MG tablet Take 50 mg by mouth 2 times daily  4/10/22  Unknown, Entered By History

## 2022-06-08 NOTE — ED PROVIDER NOTES
EMERGENCY DEPARTMENT ENCOUNTER     NAME: Milagro Thomas   AGE: 46 year old female   YOB: 1975   MRN: [id number]   EVALUATION DATE & TIME: 6/7/2022  8:02 PM   PCP: Misha Stevenson     Chief Complaint   Patient presents with     Tachycardia     Chest Pain   :    FINAL IMPRESSION       1. SVT (supraventricular tachycardia) (H)    2. Atrial fibrillation with RVR (H)           ED COURSE & MEDICAL DECISION MAKING      Pertinent Labs & Imaging studies reviewed. (See chart for details)   46 year old female  presents to the Emergency Department for evaluation of palpitations.  Patient has a history of a similar presentation in April of this year where she came in in SVT, was given adenosine and then converted to atrial fibrillation with rapid ventricular response that was pretty refractory and ended up requiring drips of medication.  She is anticoagulated, and she states that she was eating dinner at around 530 tonight when this started.  She already took 240 mg of extended release diltiazem before she came in. Initial Vitals Reviewed. Initial exam notable for morbidly obese patient who is tachycardic.  When she initially came in her heart rate is around 200 and EKG confirms SVT with a narrow complex very regular rhythm.  We decided to treat with adenosine therapy, and after 3 attempts at 6 mg, 12 mg, 12 mg what happened is she would have a pause followed by about 5 to 10 seconds of sinus rhythm, and then interestingly after the last 1 went back into what is obviously atrial fibrillation with rapid ventricular response at a different rate ranging between 150 and 170.  I then treated with IV diltiazem at 30 mg and her heart rate is still in the 130s.  At this point I called and discussed the case with Dr. Gil of cardiology.  She recommended an initiating a diltiazem drip to help with her rate control, but also suggested initiating amiodarone.  A bolus was given and a drip was started.  I discussed the case  with hospitalist Dr. Ariza and cardiology will consult in the morning.           At the conclusion of the encounter I discussed the results of all of the tests and the disposition. The questions were answered. The patient or family acknowledged understanding and was agreeable with the care plan.     60 minutes critical care time, see procedure note below for details if relevant    8:10 PM Met with patient and performed initial interview and exam. Discussed plan of care.  8:19 PM Checked in on patient.   9:01 PM Performed chemical cardioversion procedure.   9:26 PM Spoke with cardiologist, Dr. Ontiveros.   9:41 PM Put in bed request and paged admitting provider.   9:59 PM Spoke with the hospitalist, Dr. Ariza, who accepts patient for admission.   10:03 PM Checked in on and updated patient.     MEDICATIONS GIVEN IN THE EMERGENCY:   Medications - No data to display   NEW PRESCRIPTIONS STARTED AT TODAY'S ER VISIT   New Prescriptions    No medications on file     ================================================================   HISTORY OF PRESENT ILLNESS       Patient information was obtained from: patient    Use of Intrepreter: N/A   Milagro Thomas is a 46 year old female with history of paroxysmal atrial fibrillation, SVT, hypertension, diabetes mellitus type 2, and TBI who presents to the Emergency Department for evaluation of elevated heart rate.     Per chart review, on 4/9/2022 (~2 months ago), patient was admitted to [facility] for evaluation of palpitations and symptomatic atrial fibrillation with RVR. In the ED, patient was in very rapid SVT. Received IV adenosine and converted into atrial fibrillation with RVR. Patient was given adenosine twice and tried to electrical cardioversion twice without success of converting from atrial fibrillation to sinus rhythm. She was started on diltiazem infusion which was changed to diltiazem 240 mg daily with PTA metoprolol 25 mg twice daily. Patient discharged on 4/10/2022  and metoprolol was increased to 50 mg twice daily. Warfarin was continued. Also instructed to follow-up with cardiologist in a week.      Today at 6:30 PM (~2 hours ago), patient was sitting at dinner when she felt chest pressure and a sudden elevation of her heart rate. She took 240 mg of diltiazem PO. Currently rates chest pressure as a 6/10. She is also feeling diaphoretic. Patient's cardiologist is Dr. Merlin Rodriguez with [facility] services. Patient denies any other complaints at this time.     ================================================================    REVIEW OF SYSTEMS       Review of Systems   Constitutional: Positive for diaphoresis. Negative for fever.   HENT: Negative for congestion.    Eyes: Negative for redness.   Respiratory: Negative for shortness of breath.    Cardiovascular: Positive for chest pain (pressure).        Positive for elevated heart rate.    Gastrointestinal: Negative for abdominal pain.   Genitourinary: Negative for difficulty urinating.   Musculoskeletal: Negative for arthralgias and neck stiffness.   Skin: Negative for color change.   Neurological: Negative for headaches.   Psychiatric/Behavioral: Negative for confusion.   All other systems reviewed and are negative.        PAST HISTORY     PAST MEDICAL HISTORY:   Past Medical History:   Diagnosis Date     Essential hypertension      Morbid obesity (H)      CALLY (obstructive sleep apnea)      Paroxysmal atrial fibrillation (H)     s/p ablation x2     TBI (traumatic brain injury) (H)      Type 2 diabetes mellitus without complication, without long-term current use of insulin (H)       PAST SURGICAL HISTORY:   Past Surgical History:   Procedure Laterality Date      SECTION      x2     CHOLECYSTECTOMY       FOOT SURGERY      bone spur and reconstruction      CURRENT MEDICATIONS:   acetaminophen (TYLENOL) 500 MG tablet  clobetasol (TEMOVATE) 0.05 % cream  diltiazem ER COATED BEADS (CARDIZEM CD/CARTIA XT) 240 MG 24 hr  "capsule  furosemide (LASIX) 20 MG tablet  gabapentin (NEURONTIN) 300 MG capsule  HEMP OIL OR EXTRACT OR OTHER CBD CANNABINOID, NOT MEDICAL CANNABIS,  hydrocortisone 1 % cream  levonorgestrel (MIRENA) 20 mcg/24 hr (5 years) IUD  magnesium oxide (MAG-OX) 400 MG tablet  Melatonin 10 MG CAPS  Melatonin 5 MG CHEW  metFORMIN (GLUCOPHAGE-XR) 500 MG 24 hr tablet  metoprolol tartrate (LOPRESSOR) 50 MG tablet  ondansetron (ZOFRAN) 4 MG tablet  tiZANidine (ZANAFLEX) 4 MG tablet  warfarin (COUMADIN/JANTOVEN) 5 MG tablet      ALLERGIES:   Allergies   Allergen Reactions     Apixaban Other (See Comments)     Other reaction(s): menorrhagia  Menorrhagia       Morphine (Pf) [Morphine] Other (See Comments)     Heart rate slows and resp slow     Prednisone Palpitations      FAMILY HISTORY:   Family History   Problem Relation Age of Onset     Diabetes Father      CABG Father 50.00     Atrial fibrillation Father      Sleep Apnea Father      Skin Cancer Father      Diabetes Maternal Aunt      No Known Problems Mother      No Known Problems Son       SOCIAL HISTORY:   Social History     Socioeconomic History     Marital status:      Number of children: 2   Tobacco Use     Smoking status: Former Smoker     Types: Cigarettes, Cigarettes     Smokeless tobacco: Never Used   Substance and Sexual Activity     Alcohol use: No     Drug use: No     Sexual activity: Yes     Partners: Male     Birth control/protection: Implant        VITALS  Patient Vitals for the past 24 hrs:   Temp Temp src Height Weight   06/07/22 2003 98.2  F (36.8  C) Oral 1.727 m (5' 8\") 137.9 kg (304 lb)        ================================================================    PHYSICAL EXAM     VITAL SIGNS: Temp 98.2  F (36.8  C) (Oral)   Ht 1.727 m (5' 8\")   Wt 137.9 kg (304 lb)   BMI 46.22 kg/m     Constitutional:  Awake, no acute distress. Morbidly obese.   HENT:  Atraumatic, oropharynx without exudate or erythema, membranes moist  Lymph:  No adenopathy  Eyes: " EOM intact, PERRL, no injection  Neck: Supple  Respiratory:  Clear to auscultation bilaterally, no wheezes or crackles   Cardiovascular:  Tachycardic and regular rhythm, single S1 and S2   GI:  Soft, nontender, nondistended, no rebound or guarding   Musculoskeletal:  Moves all extremities, no lower extremity edema, no deformities    Skin:  Warm, diaphoretic.   Neurologic:  Alert and oriented x3, no focal deficits noted       ================================================================  LAB       All pertinent labs reviewed and interpreted.   Labs Ordered and Resulted from Time of ED Arrival to Time of ED Departure   BASIC METABOLIC PANEL - Abnormal       Result Value    Sodium 141      Potassium 3.9      Chloride 105      Carbon Dioxide (CO2) 23      Anion Gap 13      Urea Nitrogen 12      Creatinine 0.70      Calcium 9.8      Glucose 177 (*)     GFR Estimate >90     MAGNESIUM - Abnormal    Magnesium 1.6 (*)    INR - Abnormal    INR 2.21 (*)    CBC WITH PLATELETS AND DIFFERENTIAL - Abnormal    WBC Count 8.7      RBC Count 4.61      Hemoglobin 12.0      Hematocrit 36.9      MCV 80      MCH 26.0 (*)     MCHC 32.5      RDW 13.8      Platelet Count 256      % Neutrophils 60      % Lymphocytes 33      % Monocytes 6      % Eosinophils 1      % Basophils 0      % Immature Granulocytes 0      NRBCs per 100 WBC 0      Absolute Neutrophils 5.2      Absolute Lymphocytes 2.9      Absolute Monocytes 0.6      Absolute Eosinophils 0.1      Absolute Basophils 0.0      Absolute Immature Granulocytes 0.0      Absolute NRBCs 0.0     TSH WITH FREE T4 REFLEX - Normal    TSH 1.44     PARTIAL THROMBOPLASTIN TIME - Normal    aPTT 35     COVID-19 VIRUS (CORONAVIRUS) BY PCR   TROPONIN I        ===============================================================  RADIOLOGY       Reviewed all pertinent imaging. Please see official radiology report.   No orders to display          ================================================================  EKG     Initial EKG reviewed interpreted by me shows marked motion artifact but SVT with a rate of 199, narrow complex regular rhythm, normal axis,  with no significant acute ST or T wave changes    I have independently reviewed and interpreted the EKG(s) documented above.     ================================================================  PROCEDURES     PROCEDURE: Chemical Cardioversion    INDICATIONS: SVT   CARDIOVERSION TYPE: Chemical, Adenosine   CARDIOVERSION PROVIDER: Dr Remedios Gonzalez   SEDATION: None   CONSENT: Risks, benefits and alternatives were discussed with and Verbal consent was obtained from Patient.   PROCEDURE SPECIFIC CHECKLIST COMPLETED: Yes   TIME OUT: Universal protocol was followed. TIME OUT conducted just prior to starting procedure confirmed patient identity, site/side, procedure, patient position, and availability of correct equipment. Yes.   MEDICATIONS GIVEN: 6 mg Adenosine, IV push, followed by rapid flush, then same with 12mg, 12mg     MONITORING: Heart rate, cardiac monitor, continuous pulse oximeter, frequent blood pressure checks, level of consciousness checks, IV access, constant attendance by RN until patient is recovered and aconstant attendance by MD until patient is stable   RESPONSE: Vital signs stable, airway patent and O2 saturations remained >92%     Brief pause, then 5 to 10 seconds of sinus rhythm with a few beats followed by going back into a tachycardic rhythm.  After the third attempt, she went into an irregularly irregular rhythm with a heart rate in the 150s consistent with A. fib RVR   COMPLICATIONS: Patient tolerated procedure well, with complication: unsuccessful       Critical Care  Performed by: Remedios Acosta MD  Authorized by: Remedios Acosta MD  Total critical care time: 60 minutes  Critical care time was exclusive of separately billable procedures and treating other  patients.  Critical care was necessary to treat or prevent imminent or life-threatening deterioration of the following conditions: Refractory SVT, A. fib RVR requiring several different IV medications, discussions with cardiology, plans for admission  Critical care was time spent personally by me on the following activities: development of treatment plan with patient or surrogate, discussions with consultants, examination of patient, evaluation of patient's response to treatment, obtaining history from patient or surrogate, ordering and performing treatments and interventions, ordering and review of laboratory studies, ordering and review of radiographic studies and re-evaluation of patient's condition, this excludes any separately billable procedures.        I, Kailyn Astorga, am serving as a scribe to document services personally performed by Dr. Acosta based on my observation and the provider's statements to me. I, Remedios Acosta MD attest that Kailyn Astorga is acting in a scribe capacity, has observed my performance of the services and has documented them in accordance with my direction.   Remedios Acosta M.D.   Emergency Medicine   [facility] EMERGENCY ROOM  [address] [address]  [phone]  Dept: [phone]      Remedios Acosta MD  06/07/22 [id number]

## 2022-06-08 NOTE — ED NOTES
Patient requesting to use topical CBD salve. States it often helps with headaches. Paged Dr Ariza. States patient can decide if she chooses to use it. Notified patient.   Dr Ariza notified of current vitals and maxed out diltiazem infusion and plans to decrease amiodarone per orders/MAR. MD placed magnesium lab order

## 2022-06-08 NOTE — PROGRESS NOTES
Received signout for admit. Have accepted, cards tele. I recommended mag be replaced as amio is part of treatment plan and can cause torsades in hypomagnesemic patients.

## 2022-06-08 NOTE — PROGRESS NOTES
Pt wants to use topical CBD on temples for FULLER  RN asks if that's okay  I have neither condoned, supported, nor opposed that choice as it is not a compound I utilize as a prescriber. Pt can decide if she wishes to proceed with application of CBD topical home supply

## 2022-06-08 NOTE — PROGRESS NOTES
Patient AOx4, pleasant, cooperative. Denies pain. Pt states headache relief after benadryl and dilaudid IV this morning. Prefers low stimulus (lights/noise). Assist of 1 w/w. Report handoff given to 3N RN. Patient transferred to the unit in stable condition.

## 2022-06-08 NOTE — PROGRESS NOTES
[facility] MEDICINE PROGRESS NOTE      Identification/Summary: Milagro Thomas is a 46 year old female with a past medical history of morbid obesity, CALLY, paroxysmal atrial fibrillation, traumatic brain injury with recurrent migraines, type 2 diabetes mellitus without complication, hypertension, chronic anticoagulation with warfarin who was admitted on 6/7/2022 for chest pain. Hospital course is notable for finding initially of SVT with subsequent transition to A. fib with RVR.   Initially started on diltiazem drip and subsequently amiodarone drip was also started.  Magnesium supplementation was undertaken.  Rate continues to be hovering between 80 and 120  Cardiology evaluation has been undertaken    Assessment and Plan:   1/.  Atrial fibrillation with RVR: Appreciate cardiology input.  Attempts to discontinuation of diltiazem will be undertaken.  Oral amiodarone will have to be transition to.  Beta-blockers will be continued.  Anticoagulation will also be ongoing.  If there is no resolution of the situation consideration is going to be given to possible cardioversion tomorrow morning  2/.  Migraine: Was administered 1 mg of IV hydromorphone and 50 mg of IV Benadryl with complete resolution of her headache and improvement in her heart rate as well.  3/.  CALLY: We will need CPAP  4/.  Type 2 diabetes mellitus Accu-Cheks, sliding scale insulin and metformin.        Diet: Combination Diet Low Saturated Fat Na <2400mg Diet, No Caffeine Diet  DVT Prophylaxis:  Warfarin  Code Status: Full Code    Anticipated possible discharge in 2 days to home once heart rate related milestones are met.  Disposition Plan   Expected Discharge: Home  Anticipated discharge location:  Home  Delays:    Cardiac issues       The patient's care was discussed with the Bedside Nurse and Patient.    Sophia Antonio MD  [facility]  [facility]  [facility]  Phone: #[phone]    Interval  History/Subjective:  Overnight patient was started on amiodarone as well as diltiazem drip.  Was evaluated by cardiology and recommendations are to try and wean off the diltiazem drip.  Patient also complained of a headache which seems to be consistent with her migraine probably worsened by lack of sleep and all the events of the overnight    Physical Exam/Objective:  Temp:  [98.2  F (36.8  C)] 98.2  F (36.8  C)  Pulse:  [108-200] 112  Resp:  [11-30] 13  BP: (113-148)/() 117/79  SpO2:  [93 %-97 %] 95 %  Body mass index is 46.22 kg/m .    GENERAL:  Alert, appears comfortable, in no acute distress, appears much older than stated age, obese   HEAD:  Normocephalic, without obvious abnormality, atraumatic   EYES:  PERRL, conjunctiva/corneas clear, no scleral icterus, EOM's intact   NECK: Supple, symmetrical, trachea midline   BACK:   Symmetric, no curvature, ROM normal   LUNGS:   Clear to auscultation bilaterally, no rales, rhonchi, or wheezing, symmetric chest rise on inhalation, respirations unlabored   HEART:   Irregularly irregular tachycardia   ABDOMEN:   Soft, non-tender, bowel sounds active all four quadrants, no masses, no organomegaly, no rebound or guarding   EXTREMITIES: Extremities normal, atraumatic, no cyanosis 1+ edema    SKIN: Dry to touch, no exanthems in the visualized areas   NEURO: Alert, oriented x3, moves all four extremities freely   PSYCH: Cooperative, behavior is appropriate      Data reviewed today: I personally reviewed all new medications, labs, imaging/diagnostics reports over the past 24 hours. Pertinent findings include:    Imaging:   Recent Results (from the past 24 hour(s))   Echocardiogram Complete   Result Value    LVEF  45-50%    Narrative    [id number]  [id number]  [id number]  [id number]^CLAUDIA^BRIE^[place] [address]     Name: JAGJIT CLINE  MRN: [id number]  : 1975  Study Date: 2022 08:27 AM  Age: 46 yrs  Gender:  Female  Patient Location: [facility]  Reason For Study: Atrial Fibrillation  Ordering Physician: BRIE TAYLOR  Performed By: PHAM     BSA: 2.4 m2  Height: 68 in  Weight: 304 lb  HR: 105  BP: 108/78 mmHg  ______________________________________________________________________________  Procedure  Complete Echo Adult. Definity (NDC #81929-688) given intravenously.  ______________________________________________________________________________  Interpretation Summary     The left ventricle is normal in size.  The visual ejection fraction is 45-50%.  There is mild global hypokinesia of the left ventricle.  The right ventricle is not well visualized.  There is borderline aortic root enlargement.  The rhythm was atrial fibrillation.  The study was technically difficult.  Compared to echo from 2/9/19 the LVEF is now mildly diminished. The study was  technically difficult.  ______________________________________________________________________________  Left Ventricle  The left ventricle is normal in size. There is mild concentric left  ventricular hypertrophy. Diastolic function not assessed due to atrial  fibrillation. The visual ejection fraction is 45-50%. There is mild global  hypokinesia of the left ventricle.     Right Ventricle  The right ventricle is not well visualized.     Atria  The left atrium is mild to moderately dilated. Right atrium not well  visualized.     Mitral Valve  The mitral valve leaflets are mildly thickened. There is mild (1+) mitral  regurgitation. There is no mitral valve stenosis.     Tricuspid Valve  Tricuspid valve leaflets appear normal. There is trace to mild tricuspid  regurgitation. There is no tricuspid stenosis.     Aortic Valve  The aortic valve is trileaflet. No aortic regurgitation is present. No aortic  stenosis is present.     Pulmonic Valve  The pulmonic valve is not well visualized. There is mild (1+) pulmonic  valvular regurgitation. There is no pulmonic valvular stenosis.      Vessels  There is borderline aortic root enlargement. Normal size ascending aorta. IVC  diameter >2.1 cm collapsing <50% with sniff suggests a high RA pressure  estimated at 15 mmHg or greater.     Pericardium  There is no pericardial effusion.     Rhythm  The rhythm was atrial fibrillation.  ______________________________________________________________________________  MMode/2D Measurements & Calculations     IVSd: 1.4 cm  LVIDd: 4.7 cm  LVIDs: 3.4 cm  LVPWd: 1.4 cm  FS: 26.9 %  LV mass(C)d: 271.5 grams  LV mass(C)dI: 111.2 grams/m2  Ao root diam: 3.6 cm  LA dimension: 4.0 cm  asc Aorta Diam: 3.5 cm  LA/Ao: 1.1  LVOT diam: 2.0 cm  LVOT area: 3.2 cm2  LA Volume Indexed (AL/bp): 37.4 ml/m2  RWT: 0.61     Time Measurements  Aortic HR: 96.0 BPM  MM HR: 76.0 BPM     Doppler Measurements & Calculations  MV E max amadou: 89.7 cm/sec  MV A max amadou: 90.2 cm/sec  MV E/A: 0.99  MV max P.4 mmHg  MV mean P.96 mmHg  MV V2 VTI: 26.0 cm  MVA(VTI): 2.0 cm2  MV dec slope: 392.2 cm/sec2  MV dec time: 0.22 sec  Ao V2 max: 103.9 cm/sec  Ao max P.0 mmHg  Ao V2 mean: 73.0 cm/sec  Ao mean P.5 mmHg  Ao V2 VTI: 19.3 cm  HAROON(I,D): 2.8 cm2  HAROON(V,D): 2.5 cm2  LV V1 max P.7 mmHg  LV V1 max: 81.5 cm/sec  LV V1 VTI: 16.4 cm  CO(LVOT): 5.1 l/min  CI(LVOT): 2.1 l/min/m2  SV(LVOT): 53.1 ml  SI(LVOT): 21.8 ml/m2  PA acc time: 0.11 sec  AV Amadou Ratio (DI): 0.78  HAROON Index (cm2/m2): 1.1  E/E': 7.9  E/E' av.5     Lateral E/e': 7.9  Medial E/e': 11.1  Peak E' Amadou: 11.3 cm/sec     ______________________________________________________________________________  Report approved by: Maritza Mishra 2022 10:46 AM             Labs:  Most Recent 3 CBC's:Recent Labs   Lab Test 22  1337 22  0125   WBC 8.7 6.4 11.1*   HGB 12.0 10.1* 12.1   MCV 80 83 83    217 307     Most Recent 3 BMP's:Recent Labs   Lab Test 22  0453 22  1337    141 139   POTASSIUM 3.8 3.9 3.7    CHLORIDE 106 105 105   CO2 24 23 23   BUN 8 12 10   CR 0.65 0.70 0.64   ANIONGAP 11 13 11   ANJU 9.8 9.8 9.3    177* 162*     Most Recent 2 LFT's:  Recent Labs   Lab Test 04/23/22  1337   AST 19   ALT 23   ALKPHOS 74   BILITOTAL 0.6       Medications:   Personally Reviewed.  Medications     amiodarone 0.5 mg/min (06/08/22 1111)     dilTIAZem HCl-Sodium Chloride 10 mg/hr (06/08/22 1155)     - MEDICATION INSTRUCTIONS -       Warfarin Therapy Reminder         acetaminophen  1,000 mg Oral Q6H     amiodarone  200 mg Oral TID w/meals     [START ON 6/15/2022] amiodarone  200 mg Oral BID     gabapentin  400 mg Oral BID     gabapentin  600 mg Oral At Bedtime     magnesium oxide  400 mg Oral At Bedtime     metFORMIN  1,000 mg Oral QPM     metoprolol tartrate  50 mg Oral BID     sodium chloride (PF)  3 mL Intracatheter Q8H

## 2022-06-08 NOTE — PHARMACY-ADMISSION MEDICATION HISTORY
Pharmacy Note - Admission Medication History    Pertinent Provider Information: none     ______________________________________________________________________    Prior To Admission (PTA) med list completed and updated in EMR.       PTA Med List   Medication Sig Last Dose     acetaminophen (TYLENOL) 500 MG tablet Take 1,000 mg by mouth every 6 hours 6/7/2022 at 1800     clobetasol (TEMOVATE) 0.05 % cream [CLOBETASOL (TEMOVATE) 0.05 % CREAM] Apply 1 application topically 2 (two) times a day as needed (eczema).  prn     diltiazem ER COATED BEADS (CARTIA XT) 240 MG 24 hr capsule Take 240 mg by mouth as needed (SVT) 6/7/2022 at Unknown time     gabapentin (NEURONTIN) 100 MG capsule Take 400 mg by mouth 2 times daily AM and afternoon 6/7/2022 at x2     gabapentin (NEURONTIN) 300 MG capsule Take 600 mg by mouth At Bedtime 6/6/2022 at Unknown time     HEMP OIL OR EXTRACT OR OTHER CBD CANNABINOID, NOT MEDICAL CANNABIS, Take 1 capsule by mouth At Bedtime 6/6/2022 at Unknown time     hydrocortisone 1 % cream [HYDROCORTISONE 1 % CREAM] Apply 1 application topically 2 (two) times a day as needed (itching).        PRN     levonorgestrel (MIRENA) 20 mcg/24 hr (5 years) IUD [LEVONORGESTREL (MIRENA) 20 MCG/24 HR (5 YEARS) IUD] 1 Device by Intrauterine route. in place     Magnesium Oxide 250 MG TABS Take 500 mg by mouth At Bedtime 6/6/2022 at Unknown time     Melatonin 10 MG CAPS Take 10 mg by mouth At Bedtime 6/6/2022 at Unknown time     Melatonin 5 MG CHEW Take 5 mg by mouth At Bedtime  6/6/2022 at Unknown time     metFORMIN (GLUCOPHAGE-XR) 500 MG 24 hr tablet [METFORMIN (GLUCOPHAGE-XR) 500 MG 24 HR TABLET] Take 1,000 mg by mouth every evening. 6/6/2022 at Unknown time     metoprolol succinate ER (TOPROL XL) 25 MG 24 hr tablet Take 12.5 mg by mouth daily 6/7/2022 at Unknown time     ondansetron (ZOFRAN) 4 MG tablet Take 8 mg by mouth every 8 hours as needed for nausea PRN     tiZANidine (ZANAFLEX) 4 MG tablet Take 4 mg by mouth  every 8 hours as needed for muscle spasms 6/6/2022 at Unknown time     warfarin (COUMADIN/JANTOVEN) 5 MG tablet Take 5 mg by mouth daily 6/6/2022 at Unknown time       Information source(s): Patient and CareEverywhere/SureScripts  Method of interview communication: in-person    Summary of Changes to PTA Med List  New: none  Discontinued: lasix  Changed: metoprolol dose/form; gabapentin dosing; magnesium dosing; warfarin dosing;  Diltiazem to PRN    Patient was asked about OTC/herbal products specifically.  PTA med list reflects this.    In the past week, patient estimated taking medication this percent of the time:  greater than 90%.    Allergies were reviewed, assessed, and updated with the patient.      Patient does not use any multi-dose medications prior to admission.    The information provided in this note is only as accurate as the sources available at the time of the update(s).    Thank you for the opportunity to participate in the care of this patient.    Ivis Joel [facility]  6/7/2022 10:37 PM

## 2022-06-09 ENCOUNTER — ANESTHESIA (OUTPATIENT)
Dept: INTENSIVE CARE | Facility: CLINIC | Age: 47
DRG: 309 | End: 2022-06-09
Payer: COMMERCIAL

## 2022-06-09 ENCOUNTER — ANCILLARY PROCEDURE (OUTPATIENT)
Dept: INTENSIVE CARE | Facility: CLINIC | Age: 47
DRG: 309 | End: 2022-06-09
Attending: INTERNAL MEDICINE
Payer: COMMERCIAL

## 2022-06-09 ENCOUNTER — ANESTHESIA EVENT (OUTPATIENT)
Dept: INTENSIVE CARE | Facility: CLINIC | Age: 47
DRG: 309 | End: 2022-06-09
Payer: COMMERCIAL

## 2022-06-09 VITALS
SYSTOLIC BLOOD PRESSURE: 131 MMHG | BODY MASS INDEX: 44.41 KG/M2 | HEART RATE: 64 BPM | WEIGHT: 293 LBS | HEIGHT: 68 IN | OXYGEN SATURATION: 96 % | DIASTOLIC BLOOD PRESSURE: 73 MMHG | TEMPERATURE: 98.1 F | RESPIRATION RATE: 17 BRPM

## 2022-06-09 LAB
INR PPP: 2.51 (ref 0.85–1.15)
MAGNESIUM SERPL-MCNC: 1.8 MG/DL (ref 1.8–2.6)

## 2022-06-09 PROCEDURE — 83735 ASSAY OF MAGNESIUM: CPT | Performed by: HOSPITALIST

## 2022-06-09 PROCEDURE — 5A2204Z RESTORATION OF CARDIAC RHYTHM, SINGLE: ICD-10-PCS | Performed by: INTERNAL MEDICINE

## 2022-06-09 PROCEDURE — 250N000013 HC RX MED GY IP 250 OP 250 PS 637: Performed by: HOSPITALIST

## 2022-06-09 PROCEDURE — 92960 CARDIOVERSION ELECTRIC EXT: CPT | Performed by: INTERNAL MEDICINE

## 2022-06-09 PROCEDURE — 99239 HOSP IP/OBS DSCHRG MGMT >30: CPT | Performed by: INTERNAL MEDICINE

## 2022-06-09 PROCEDURE — 99233 SBSQ HOSP IP/OBS HIGH 50: CPT | Mod: 25 | Performed by: INTERNAL MEDICINE

## 2022-06-09 PROCEDURE — 250N000013 HC RX MED GY IP 250 OP 250 PS 637: Performed by: INTERNAL MEDICINE

## 2022-06-09 PROCEDURE — 370N000017 HC ANESTHESIA TECHNICAL FEE, PER MIN

## 2022-06-09 PROCEDURE — 250N000009 HC RX 250: Performed by: NURSE ANESTHETIST, CERTIFIED REGISTERED

## 2022-06-09 PROCEDURE — 85610 PROTHROMBIN TIME: CPT | Performed by: HOSPITALIST

## 2022-06-09 PROCEDURE — 36415 COLL VENOUS BLD VENIPUNCTURE: CPT | Performed by: HOSPITALIST

## 2022-06-09 RX ORDER — AMIODARONE HYDROCHLORIDE 200 MG/1
200 TABLET ORAL
Qty: 18 TABLET | Refills: 0 | Status: ON HOLD | OUTPATIENT
Start: 2022-06-09 | End: 2022-07-05

## 2022-06-09 RX ORDER — METOPROLOL TARTRATE 50 MG
50 TABLET ORAL 2 TIMES DAILY
Qty: 60 TABLET | Refills: 0 | Status: ON HOLD | OUTPATIENT
Start: 2022-06-09 | End: 2022-07-05

## 2022-06-09 RX ORDER — WARFARIN SODIUM 2 MG/1
4 TABLET ORAL
Status: DISCONTINUED | OUTPATIENT
Start: 2022-06-09 | End: 2022-06-09 | Stop reason: HOSPADM

## 2022-06-09 RX ORDER — AMIODARONE HYDROCHLORIDE 200 MG/1
200 TABLET ORAL 2 TIMES DAILY
Qty: 60 TABLET | Refills: 0 | Status: SHIPPED | OUTPATIENT
Start: 2022-06-16

## 2022-06-09 RX ADMIN — ACETAMINOPHEN 1000 MG: 500 TABLET, FILM COATED ORAL at 11:15

## 2022-06-09 RX ADMIN — METHOHEXITAL SODIUM 140 MG: 500 INJECTION, POWDER, LYOPHILIZED, FOR SOLUTION INTRAMUSCULAR; INTRAVENOUS; RECTAL at 09:39

## 2022-06-09 RX ADMIN — METOPROLOL TARTRATE 50 MG: 50 TABLET, FILM COATED ORAL at 10:01

## 2022-06-09 RX ADMIN — ACETAMINOPHEN 1000 MG: 500 TABLET, FILM COATED ORAL at 04:36

## 2022-06-09 RX ADMIN — GABAPENTIN 400 MG: 400 CAPSULE ORAL at 10:01

## 2022-06-09 RX ADMIN — AMIODARONE HYDROCHLORIDE 200 MG: 200 TABLET ORAL at 10:01

## 2022-06-09 ASSESSMENT — ACTIVITIES OF DAILY LIVING (ADL)
ADLS_ACUITY_SCORE: 23

## 2022-06-09 ASSESSMENT — ENCOUNTER SYMPTOMS: DYSRHYTHMIAS: 1

## 2022-06-09 NOTE — ANESTHESIA POSTPROCEDURE EVALUATION
Patient: Milagro Thomas    Procedure: * No procedures listed *  Cardioversion External    Anesthesia Type:  General    Note:  Disposition: ICU            ICU Sign Out: Anesthesiologist/ICU physician sign out WAS performed   Postop Pain Control: Uneventful            Sign Out: Well controlled pain   PONV: No   Neuro/Psych: Uneventful            Sign Out: Acceptable/Baseline neuro status   Airway/Respiratory: Uneventful            Sign Out: Acceptable/Baseline resp. status   CV/Hemodynamics: Uneventful            Sign Out: Acceptable CV status; No obvious hypovolemia; No obvious fluid overload   Other NRE: NONE   DID A NON-ROUTINE EVENT OCCUR? No           Last vitals:  There were no vitals filed for this visit.    Electronically Signed By: Kelly Antonio MD  June 9, 2022  10:17 AM

## 2022-06-09 NOTE — PLAN OF CARE
"Pt A&OX4, VSS, Sustained A-fib 60-90  HR would get as high as 110 w/ any activity. Asymptomatic. Diltiazem remained off. PO Amio. Pain controlled. NPO. Slept well overnight. Possible cardioversion this am. Will continue to monitor and carry out plan of care. Kirstin Bates RN                Problem: Plan of Care - These are the overarching goals to be used throughout the patient stay.    Goal: Plan of Care Review/Shift Note  Description: The Plan of Care Review/Shift note should be completed every shift.  The Outcome Evaluation is a brief statement about your assessment that the patient is improving, declining, or no change.  This information will be displayed automatically on your shift note.  Outcome: Ongoing, Progressing  Goal: Patient-Specific Goal (Individualized)  Description: You can add care plan individualizations to a care plan. Examples of Individualization might be:  \"Parent requests to be called daily at 9am for status\", \"I have a hard time hearing out of my right ear\", or \"Do not touch me to wake me up as it startles me\".  Outcome: Ongoing, Progressing  Goal: Absence of Hospital-Acquired Illness or Injury  Outcome: Ongoing, Progressing  Intervention: Identify and Manage Fall Risk  Recent Flowsheet Documentation  Taken 6/9/2022 0400 by Kirstin Bates RN  Safety Promotion/Fall Prevention: assistive device/personal items within reach  Taken 6/9/2022 0000 by Kirstin Bates RN  Safety Promotion/Fall Prevention: assistive device/personal items within reach  Taken 6/8/2022 2000 by Kirstin Bates RN  Safety Promotion/Fall Prevention: assistive device/personal items within reach  Intervention: Prevent Skin Injury  Recent Flowsheet Documentation  Taken 6/9/2022 0400 by Kirstin Bates RN  Body Position: position changed independently  Taken 6/9/2022 0000 by Kirstin Bates RN  Body Position: position changed independently  Taken 6/8/2022 2000 by Kirstin Bates RN  Body Position: " position changed independently  Intervention: Prevent and Manage VTE (Venous Thromboembolism) Risk  Recent Flowsheet Documentation  Taken 6/9/2022 0400 by Kirstin Bates, RN  Activity Management: activity adjusted per tolerance  Taken 6/9/2022 0000 by Kirstin Bates RN  Activity Management: activity adjusted per tolerance  Taken 6/8/2022 2000 by Kirstin Bates RN  Activity Management: activity adjusted per tolerance  Goal: Optimal Comfort and Wellbeing  Outcome: Ongoing, Progressing  Intervention: Monitor Pain and Promote Comfort  Recent Flowsheet Documentation  Taken 6/9/2022 0000 by Kirstin Bates RN  Pain Management Interventions:   medication (see MAR)   care clustered   environmental changes   repositioned   rest  Taken 6/8/2022 2200 by Kirstin Bates RN  Pain Management Interventions:   medication (see MAR)   care clustered   environmental changes   repositioned   rest  Taken 6/8/2022 2030 by Kirstin Bates RN  Pain Management Interventions:   medication (see MAR)   care clustered   environmental changes   repositioned   rest  Taken 6/8/2022 2000 by Kirstin Bates RN  Pain Management Interventions:   medication (see MAR)   care clustered   environmental changes   repositioned   rest  Intervention: Provide Person-Centered Care  Recent Flowsheet Documentation  Taken 6/9/2022 0400 by Kirstin Bates RN  Trust Relationship/Rapport: care explained  Taken 6/9/2022 0000 by Kirstin Bates RN  Trust Relationship/Rapport: care explained  Taken 6/8/2022 2000 by Kirstin Bates RN  Trust Relationship/Rapport: care explained  Goal: Readiness for Transition of Care  Outcome: Ongoing, Progressing   Goal Outcome Evaluation:

## 2022-06-09 NOTE — DISCHARGE SUMMARY
[facility] MEDICINE  DISCHARGE SUMMARY     Primary Care Physician: Misha Stevenson  Admission Date: 6/7/2022   Discharge Provider: Lily Maloney MD Discharge Date: 6/9/2022   Diet: Orders Placed This Encounter      Low Saturated Fat Na <2400 mg      Diet      Code Status: Full Code   Activity: activity as tolerated   [facility]      Condition at Discharge: Stable      REASON FOR PRESENTATION(See Admission Note for Details)   Chest pain    PRINCIPAL & ACTIVE DISCHARGE DIAGNOSES     Active Problems:    Atrial fibrillation with RVR (H) s/p cardioversion 6/9/2022  SVT  Hypomagnesemia replaced  Obstructive sleep apnea on CPAP  Diabetes mellitus type 2  Migraine headaches  Morbid obesity      SIGNIFICANT FINDINGS (Imaging, labs):   Echocardiogram  The left ventricle is normal in size.   The visual ejection fraction is 45-50%.   There is mild global hypokinesia of the left ventricle.   The right ventricle is not well visualized.   There is borderline aortic root enlargement.   The rhythm was atrial fibrillation.   The study was technically difficult.   Compared to echo from 2/9/19 the LVEF is now mildly diminished. The study was   technically difficult.     PENDING LABS         PROCEDURES ( this hospitalization only)          RECOMMENDATION FOR F/U VISIT       DISPOSITION     Home    SUMMARY OF HOSPITAL COURSE:    46 year old female with a past medical history of morbid obesity, CALLY, paroxysmal atrial fibrillation on Coumadin, traumatic brain injury with recurrent migraines, type 2 diabetes mellitus without complication, hypertension presented to the emergency room with complaints of chest pain.  She was found to be in tachycardia with heart rate of 190s she received any notes in converted to sinus rhythm transiently and then developed A. fib RVR.  She was started on amiodarone drip, IV diltiazem.  She was seen by cardiology.  Transitioned off of diltiazem.  Started on amiodarone,  metoprolol continued on Coumadin.  Her heart rates have been high, she had cardioversion on 6/9/2022 with conversion to normal sinus rhythm.  Cardiology cleared her for discharge.  She will follow-up with [facility], [facility] clinic as outpatient.    Discharge Medications with Med changes:        Review of your medicines      START taking      Dose / Directions   * amiodarone 200 MG tablet  Commonly known as: PACERONE  Used for: Paroxysmal atrial fibrillation (H)      Dose: 200 mg  Take 1 tablet (200 mg) by mouth 3 times daily (with meals) for 6 days  Quantity: 18 tablet  Refills: 0     * amiodarone 200 MG tablet  Commonly known as: PACERONE  Used for: Paroxysmal atrial fibrillation (H)      Dose: 200 mg  Start taking on: June 16, 2022  Take 1 tablet (200 mg) by mouth 2 times daily  Quantity: 60 tablet  Refills: 0     metoprolol tartrate 50 MG tablet  Commonly known as: LOPRESSOR  Used for: Paroxysmal atrial fibrillation (H)      Dose: 50 mg  Take 1 tablet (50 mg) by mouth 2 times daily  Quantity: 60 tablet  Refills: 0         * This list has 2 medication(s) that are the same as other medications prescribed for you. Read the directions carefully, and ask your doctor or other care provider to review them with you.            CONTINUE these medicines which have NOT CHANGED      Dose / Directions   acetaminophen 500 MG tablet  Commonly known as: TYLENOL      Dose: 1,000 mg  Take 1,000 mg by mouth every 6 hours  Refills: 0     clobetasol 0.05 % external cream  Commonly known as: TEMOVATE      Dose: 1 Application  [CLOBETASOL (TEMOVATE) 0.05 % CREAM] Apply 1 application topically 2 (two) times a day as needed (eczema).  Refills: 0     * gabapentin 300 MG capsule  Commonly known as: NEURONTIN      Dose: 600 mg  Take 600 mg by mouth At Bedtime  Refills: 0     * gabapentin 100 MG capsule  Commonly known as: NEURONTIN      Dose: 400 mg  Take 400 mg by mouth 2 times daily AM and afternoon  Refills: 0     HEMP OIL OR EXTRACT OR  OTHER CBD CANNABINOID (NOT MEDICAL CANNABIS)      Dose: 1 capsule  Take 1 capsule by mouth At Bedtime  Refills: 0     hydrocortisone 1 % external cream  Commonly known as: CORTAID      Dose: 1 Application  [HYDROCORTISONE 1 % CREAM] Apply 1 application topically 2 (two) times a day as needed (itching).  Refills: 0     levonorgestrel 20 MCG/24HR IUD  Commonly known as: MIRENA      Dose: 1 Device  [LEVONORGESTREL (MIRENA) 20 MCG/24 HR (5 YEARS) IUD] 1 Device by Intrauterine route.  Refills: 0     Magnesium Oxide 250 MG Tabs      Dose: 500 mg  Take 500 mg by mouth At Bedtime  Refills: 0     * Melatonin 5 MG Chew      Dose: 5 mg  Take 5 mg by mouth At Bedtime  Refills: 0     * Melatonin 10 MG Caps      Dose: 10 mg  Take 10 mg by mouth At Bedtime  Refills: 0     metFORMIN 500 MG 24 hr tablet  Commonly known as: GLUCOPHAGE XR      Dose: 1,000 mg  [METFORMIN (GLUCOPHAGE-XR) 500 MG 24 HR TABLET] Take 1,000 mg by mouth every evening.  Refills: 0     ondansetron 4 MG tablet  Commonly known as: ZOFRAN      Dose: 8 mg  Take 8 mg by mouth every 8 hours as needed for nausea  Refills: 0     tiZANidine 4 MG tablet  Commonly known as: ZANAFLEX      Dose: 4 mg  Take 4 mg by mouth every 8 hours as needed for muscle spasms  Refills: 0     warfarin ANTICOAGULANT 5 MG tablet  Commonly known as: COUMADIN      Dose: 5 mg  Take 5 mg by mouth daily  Refills: 0         * This list has 4 medication(s) that are the same as other medications prescribed for you. Read the directions carefully, and ask your doctor or other care provider to review them with you.            STOP taking    Cartia  MG 24 hr capsule  Generic drug: diltiazem ER COATED BEADS        metoprolol succinate ER 25 MG 24 hr tablet  Commonly known as: TOPROL XL              Where to get your medicines      These medications were sent to [place] [address] [place] [address] [address]    Phone: [phone]  "  amiodarone 200 MG tablet  amiodarone 200 MG tablet  metoprolol tartrate 50 MG tablet              Rationale for medication changes:    Metoprolol 50 mg twice daily, amiodarone for rapid A. fib        Consults   Cardiology      Immunizations given this encounter     [unfilled]      Discharge Procedure Orders   Follow-Up with Cardiology JOHN   Standing Status: Future   Referral Priority: Routine: Next available opening Referral Type: Consultation   Requested Specialty: Cardiovascular Disease     Follow-Up with Cardiology EP   Standing Status: Future   Referral Priority: Routine: Next available opening Referral Type: Consultation   Requested Specialty: Cardiovascular Disease     Reason for your hospital stay   Order Comments: Atrial fibrillation with rapid ventricular rate status post cardioversion     Follow-up and recommended labs and tests   Order Comments: Follow up with primary care provider, DIRK LEWIS, within 7 days for hospital follow- up.    Follow-up with cardiology as recommended.     Activity   Order Comments: Your activity upon discharge: activity as tolerated     Order Specific Question Answer Comments   Is discharge order? Yes      Diet   Order Comments: Follow this diet upon discharge: Orders Placed This Encounter      Low Saturated Fat Na <2400 mg       Order Specific Question Answer Comments   Is discharge order? Yes      Examination     Vital Signs in last 24 hours:   Vital signs:  Temp: 98.1  F (36.7  C) Temp src: Oral BP: 131/73 Pulse: 64   Resp: 17 SpO2: 96 % O2 Device: Nasal cannula Oxygen Delivery: 4 LPM Height: 172.7 cm (5' 7.99\") Weight: 136 kg (299 lb 12.8 oz)  Estimated body mass index is 45.6 kg/m  as calculated from the following:    Height as of this encounter: 1.727 m (5' 7.99\").    Weight as of this encounter: 136 kg (299 lb 12.8 oz).       Pertinent positives on exam:  Heart S1-S2 heard regular rate and rhythm  Lungs clear to auscultation bilaterally    Please see EMR for " more detailed significant labs, imaging, consultant notes etc.  Total time spent on discharge: > 35 minutes    Lily Maloney MD   [facility]: Ph:[phone]    CC:Misha Stevenson

## 2022-06-09 NOTE — ANESTHESIA CARE TRANSFER NOTE
Patient: Milagro Thomas    Procedure: * No procedures listed *  Cardioversion External    Diagnosis: * No pre-op diagnosis entered *  Diagnosis Additional Information: No value filed.    Anesthesia Type:   No value filed.     Note:    Oropharynx: oropharynx clear of all foreign objects and spontaneously breathing  Level of Consciousness: awake  Oxygen Supplementation: nasal cannula  Level of Supplemental Oxygen (L/min / FiO2): 4  Independent Airway: airway patency satisfactory and stable    Vital Signs Stable: post-procedure vital signs reviewed and stable  Report to RN Given: handoff report given  Patient transferred to: ICU    ICU Handoff: Call for PAUSE to initiate/utilize ICU HANDOFF, Identified Patient, Identified Responsible Provider, Reviewed the Pertinent Medical History, Discussed Surgical Course, Reviewed Intra-OP Anesthesia Management and Issues during Anesthesia, Set Expectations for Post Procedure Period and Allowed Opportunity for Questions and Acknowledgement of Understanding        Electronically Signed By: EARL PRESSLEY CRNA  June 9, 2022  9:47 AM

## 2022-06-09 NOTE — ANESTHESIA PREPROCEDURE EVALUATION
Anesthesia Pre-Procedure Evaluation    Patient: Milagro Thomas   MRN: [id number] : 1975        Procedure : * No procedures listed *  Cardioversion External       Past Medical History:   Diagnosis Date     Essential hypertension      Morbid obesity (H)      CALLY (obstructive sleep apnea)      Paroxysmal atrial fibrillation (H)     s/p ablation x2     TBI (traumatic brain injury) (H)      Type 2 diabetes mellitus without complication, without long-term current use of insulin (H)       Past Surgical History:   Procedure Laterality Date      SECTION      x2     CHOLECYSTECTOMY       FOOT SURGERY      bone spur and reconstruction      Allergies   Allergen Reactions     Apixaban Other (See Comments)     Other reaction(s): menorrhagia  Menorrhagia       Morphine (Pf) [Morphine] Other (See Comments)     Heart rate slows and resp slow     Prednisone Palpitations      Social History     Tobacco Use     Smoking status: Former Smoker     Types: Cigarettes, Cigarettes     Smokeless tobacco: Never Used   Substance Use Topics     Alcohol use: No      Wt Readings from Last 1 Encounters:   22 136 kg (299 lb 12.8 oz)        Anesthesia Evaluation   Pt has had prior anesthetic.         ROS/MED HX  ENT/Pulmonary:     (+) sleep apnea, uses CPAP,     Neurologic: Comment: TBI      Cardiovascular: Comment: ECHO 22  LVEF 45-55%    (+) hypertension-----dysrhythmias, a-fib,     METS/Exercise Tolerance:     Hematologic:  - neg hematologic  ROS     Musculoskeletal:       GI/Hepatic:  - neg GI/hepatic ROS     Renal/Genitourinary:  - neg Renal ROS     Endo:     (+) type II DM, Obesity,     Psychiatric/Substance Use:  - neg psychiatric ROS     Infectious Disease:  - neg infectious disease ROS     Malignancy:       Other:            Physical Exam    Airway        Mallampati: I   TM distance: > 3 FB   Neck ROM: full   Mouth opening: > 3 cm    Respiratory Devices and Support         Dental  no notable dental history          Cardiovascular          Rhythm and rate: irregular     Pulmonary           breath sounds clear to auscultation           OUTSIDE LABS:  CBC:   Lab Results   Component Value Date    WBC 8.7 06/07/2022    WBC 6.4 04/23/2022    HGB 12.0 06/07/2022    HGB 10.1 (L) 04/23/2022    HCT 36.9 06/07/2022    HCT 31.7 (L) 04/23/2022     06/07/2022     04/23/2022     BMP:   Lab Results   Component Value Date     06/08/2022     06/07/2022    POTASSIUM 3.8 06/08/2022    POTASSIUM 3.9 06/07/2022    CHLORIDE 106 06/08/2022    CHLORIDE 105 06/07/2022    CO2 24 06/08/2022    CO2 23 06/07/2022    BUN 8 06/08/2022    BUN 12 06/07/2022    CR 0.65 06/08/2022    CR 0.70 06/07/2022     06/08/2022     (H) 06/07/2022     COAGS:   Lab Results   Component Value Date    PTT 35 06/07/2022    INR 2.51 (H) 06/09/2022     POC:   Lab Results   Component Value Date    HCGS Negative 09/07/2019     HEPATIC:   Lab Results   Component Value Date    ALBUMIN 3.5 04/23/2022    PROTTOTAL 6.7 04/23/2022    ALT 23 04/23/2022    AST 19 04/23/2022    ALKPHOS 74 04/23/2022    BILITOTAL 0.6 04/23/2022     OTHER:   Lab Results   Component Value Date    LACT 1.1 04/09/2022    A1C 6.5 (H) 04/09/2022    ANJU 9.8 06/08/2022    MAG 1.8 06/09/2022    TSH 1.44 06/07/2022    T4 1.01 04/09/2022       Anesthesia Plan    ASA Status:  3, emergent       Anesthesia Type: General.     - Airway: Mask Only              Consents    Anesthesia Plan(s) and associated risks, benefits, and realistic alternatives discussed. Questions answered and patient/representative(s) expressed understanding.     - Discussed: Risks, Benefits and Alternatives for BOTH SEDATION and the PROCEDURE were discussed     - Discussed with:  Patient         Postoperative Care            Comments:                Kelly Antonio MD

## 2022-06-09 NOTE — PROGRESS NOTES
` [facility] [address], [place] [address]   [email or web address]   OFFICE: [phone]        Cardiology Addendum     Milagro underwent successful direct-current cardioversion this morning.  Plan/recommendations:    Okay for discharge from a post cardioversion/cardiology standpoint.     I will make arrangements for Milagro to follow-up with one of our Electrophysiologists for continued management of SVT and atrial fibrillation including consideration of EP study/mapping   SVT ablation (request already forwarded to schedulers through Epic).    In addition, I will make arrangements for Milagro to follow-up with one of our Nurse Practitioners in the Atrial Fibrillation Clinic in approximately 2 weeks (request already forwarded to schedulers through Epic)..    Continue amiodarone 200 mg 3 times daily for 6 more days, then 200 mg twice daily thereafter starting Wednesday, 15 Eunice 2022.    Continue metoprolol tartrate 50 mg twice daily.    Continue warfarin.

## 2022-06-09 NOTE — PROGRESS NOTES
` [facility] [address], [place] [address]   [email or web address]   OFFICE: [phone]            Impression and Plan     1.  Atrial fibrillation.  Milagro has a history of atrial fibrillation and she has undergone two prior PVI ablation procedures (2019 and 2021).  She had been on apixaban though now maintained on warfarin vis-à-vis previous gynecologic bleeding issues.  INR 2.51 this morning.  Echocardiogram this admission suggests mild reduction left ventricular systolic performance (likely tachycardia mediated).    Continue metoprolol    Continue amiodarone.    Amiodarone 200 mg 3 times daily x6 days, then 200 mg twice daily thereafter.    Transition off IV diltiazem as able.    Continue warfarin.    Plan for direct cardioversion later today.     2.  SVT.  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    AV conduction modification medical therapy as per problem #1.    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     Primary Cardiologist: Dr. Jae Brasher     Subjective     Patient comfortable.  Denies shortness of breath or chest pain.    Cardiac Diagnostics     Telemetry (personally reviewed): Atrial fibrillation.    Echocardiogram 8 June 2022:  1. Technically difficult study.  2. Normal left ventricular size with mildly reduced left ventricular systolic performance.  Ejection fraction 45-50%.  3. Mild global reduction left ventricular systolic performance.  4. Mild increase in left ventricular wall thickness.  5. No significant valvular heart disease.  6. Right ventricle not well visualized.  7. Moderate left atrial enlargement.  Right atrium not well visualized.    Echocardiogram 9 February  "2019:  1. Normal left ventricular size and systolic performance with ejection fraction of 60 to 65%.  2. No significant valvular heart disease.  3. Normal right ventricular size and systolic performance.  4. Mild left atrial enlargement.  Right atrium of normal dimension.     Twelve-lead ECG (personally reviewed) 7 June 2022 at 2013: Supraventricular tachycardia with heart rate of 199 bpm.  Nonspecific T wave changes.     Twelve-lead ECG (personally reviewed) 23 April 2022: Sinus rhythm with heart rate of 57 bpm.     Twelve-lead ECG (personally reviewed) 9 April 2022: Atrial fibrillation with heart rate of 133 bpm.       Physical Examination       /66 (BP Location: Left arm)   Pulse 86   Temp 97.8  F (36.6  C) (Oral)   Resp 22   Ht 1.727 m (5' 7.99\")   Wt 136 kg (299 lb 12.8 oz)   SpO2 93%   BMI 45.60 kg/m          Vitals:    06/07/22 2003 06/08/22 1332 06/09/22 0350   Weight: 137.9 kg (304 lb) 137.3 kg (302 lb 12.8 oz) 136 kg (299 lb 12.8 oz)              Intake/Output Summary (Last 24 hours) at 6/9/2022 0655  Last data filed at 6/9/2022 0600  Gross per 24 hour   Intake 1301.98 ml   Output --   Net 1301.98 ml       The patient is alert and oriented times three. Sclerae are anicteric. Mucosal membranes are moist. Jugular venous pressure is normal. No significant adenopathy/thyromegally appreciated. Lungs are clear with good expansion. On cardiovascular exam, the patient has an irregular S1 and S2.  Heart sounds somewhat distant.  Abdomen is soft and non-tender. Extremities reveal no clubbing, cyanosis.         Imaging       Chest radiograph 23 April 2022:  1. Cardiomegaly with pulmonary vascular congestion.   2. Probable interstitial pulmonary edema.   3. No focal infiltrate or pleural effusion.     Lab Results     Recent Labs   Lab 06/09/22  0449 06/08/22  0453 06/07/22 2022   WBC  --   --  8.7   HGB  --   --  12.0   MCV  --   --  80   PLT  --   --  256   INR 2.51* 2.24* 2.21*   NA  --  141 141 "   POTASSIUM  --  3.8 3.9   CHLORIDE  --  106 105   CO2  --  24 23   BUN  --  8 12   CR  --  0.65 0.70   ANIONGAP  --  11 13   ANJU  --  9.8 9.8   GLC  --  113 177*     Recent Labs   Lab Test 04/23/22  1337 09/07/19  1353 02/08/19 2016   * 329* 285*     No lab results found in last 7 days.    Invalid input(s): LDLCALC  Lab Results   Component Value Date     06/08/2022    CO2 24 06/08/2022    BUN 8 06/08/2022     Lab Results   Component Value Date    WBC 8.7 06/07/2022    HGB 12.0 06/07/2022    HCT 36.9 06/07/2022    MCV 80 06/07/2022     06/07/2022     Lab Results   Component Value Date    CHOL 134 05/23/2017    TRIG 114 05/23/2017    HDL 32 05/23/2017     Lab Results   Component Value Date    INR 2.51 06/09/2022     Lab Results   Component Value Date    TROPONINI 0.04 06/08/2022    TROPONINI <0.01 06/07/2022    TROPONINI <0.01 04/23/2022     Lab Results   Component Value Date    TSH 1.44 06/07/2022           Current Inpatient Scheduled Medications   Scheduled Meds:    acetaminophen  1,000 mg Oral Q6H     amiodarone  200 mg Oral TID w/meals     [START ON 6/15/2022] amiodarone  200 mg Oral BID     gabapentin  400 mg Oral BID     gabapentin  600 mg Oral At Bedtime     magnesium oxide  400 mg Oral At Bedtime     metFORMIN  1,000 mg Oral QPM     metoprolol tartrate  50 mg Oral BID     sodium chloride (PF)  3 mL Intracatheter Q8H     Continuous Infusions:    dilTIAZem HCl-Sodium Chloride 5 mg/hr (06/08/22 1604)     - MEDICATION INSTRUCTIONS -       Warfarin Therapy Reminder              Medications Prior to Admission   Prior to Admission medications    Medication Sig Start Date End Date Taking? Authorizing Provider   acetaminophen (TYLENOL) 500 MG tablet Take 1,000 mg by mouth every 6 hours   Yes Unknown, Entered By History   clobetasol (TEMOVATE) 0.05 % cream [CLOBETASOL (TEMOVATE) 0.05 % CREAM] Apply 1 application topically 2 (two) times a day as needed (eczema).  5/23/17  Yes Provider, Historical    diltiazem ER COATED BEADS (CARTIA XT) 240 MG 24 hr capsule Take 240 mg by mouth as needed (SVT)   Yes Unknown, Entered By History   gabapentin (NEURONTIN) 100 MG capsule Take 400 mg by mouth 2 times daily AM and afternoon   Yes Unknown, Entered By History   gabapentin (NEURONTIN) 300 MG capsule Take 600 mg by mouth At Bedtime   Yes Unknown, Entered By History   HEMP OIL OR EXTRACT OR OTHER CBD CANNABINOID, NOT MEDICAL CANNABIS, Take 1 capsule by mouth At Bedtime   Yes Unknown, Entered By History   hydrocortisone 1 % cream [HYDROCORTISONE 1 % CREAM] Apply 1 application topically 2 (two) times a day as needed (itching).        5/23/17  Yes Provider, Historical   levonorgestrel (MIRENA) 20 mcg/24 hr (5 years) IUD [LEVONORGESTREL (MIRENA) 20 MCG/24 HR (5 YEARS) IUD] 1 Device by Intrauterine route. 2/17/15  Yes Provider, Historical   Magnesium Oxide 250 MG TABS Take 500 mg by mouth At Bedtime   Yes Unknown, Entered By History   Melatonin 10 MG CAPS Take 10 mg by mouth At Bedtime   Yes Unknown, Entered By History   Melatonin 5 MG CHEW Take 5 mg by mouth At Bedtime  1/18/19  Yes Provider, Historical   metFORMIN (GLUCOPHAGE-XR) 500 MG 24 hr tablet [METFORMIN (GLUCOPHAGE-XR) 500 MG 24 HR TABLET] Take 1,000 mg by mouth every evening. 9/7/19  Yes Provider, Historical   metoprolol succinate ER (TOPROL XL) 25 MG 24 hr tablet Take 12.5 mg by mouth daily   Yes Unknown, Entered By History   ondansetron (ZOFRAN) 4 MG tablet Take 8 mg by mouth every 8 hours as needed for nausea   Yes Unknown, Entered By History   tiZANidine (ZANAFLEX) 4 MG tablet Take 4 mg by mouth every 8 hours as needed for muscle spasms   Yes Unknown, Entered By History   warfarin (COUMADIN/JANTOVEN) 5 MG tablet Take 5 mg by mouth daily 9/7/19  Yes Provider, Historical   diltiazem (CARDIZEM) 60 MG tablet [DILTIAZEM (CARDIZEM) 60 MG TABLET] Take 1 tablet (60 mg total) by mouth every 6 (six) hours as needed (rapid heartbeat). 9/8/19 4/10/22  Elio Rodriguez, DO    hydrALAZINE (APRESOLINE) 25 MG tablet Take 50 mg by mouth 2 times daily  4/10/22  Unknown, Entered By History

## 2022-06-09 NOTE — PROCEDURES
[facility]:   Direct current external cardioversion     : Dr.Franz-Josef ANTON Jefferson        Impression:     Successful direct current cardioversion for symptomatic atrial fibrillation with rapid ventricular response.   Recommendations:     The patient will be observed until stable.    Okay for discharge from a post cardioversion standpoint.     I will make arrangements for Milagro to follow-up with one of our Electrophysiologists for continued management of SVT and atrial fibrillation including consideration of EP study/mapping +/- SVT ablation.    In addition, I will make arrangements for Milagro to follow-up with one of our Nurse Practitioners in the atrial fibrillation clinic in approximately 2 weeks.    Continue amiodarone 200 mg 3 times daily for 6 more days, then 200 mg twice daily thereafter starting Wednesday, 15 Eunice 2022.     Indication: Recurrent symptomatic persistent atrial fibrillation     Procedure initiation:   The patient received brief complete sedation per the Anesthesiology Service.  Milagro underwent direct-current cardioversion with initial 360 J shock followed by additional 360 J shock with ultimate restoration of sinus rhythm without significant bradycardia or pauses.    The patient recovered uneventfully with close attention to her respiratory exchange.

## 2022-06-09 NOTE — CONSULTS
"ACUPUNCTURIST TREATMENT NOTE    Name: Milagro Thomas  :  1975  MRN:  [id number]    Acupuncture Treatment  Patient Type: Medical  Intervention Reason: Headache  Pre-session Headache Rating: 3  Post-session Headache Ratin  Patient complaint:: Headache  Initial insertions: Du 16, 20, Gb 20, Lukas chavez, Li 4     \"Risks and benefits of acupuncture were discussed with patient. Consent for treatment was given. We thank you for the referral.\"     Chidi Almazan    Date:  2022  Time:  11:36 AM    "

## 2022-06-09 NOTE — PROGRESS NOTES
"CLINICAL NUTRITION SERVICES - ASSESSMENT NOTE     Nutrition Prescription    RECOMMENDATIONS FOR MDs/PROVIDERS TO ORDER:  None    Malnutrition Status:    Patient does not meet two of the above criteria necessary for diagnosing malnutrition    Recommendations already ordered by Registered Dietitian (RD):  None at this time    Future/Additional Recommendations:  None      REASON FOR ASSESSMENT  Milagro Thomas is a/an 46 year old female assessed by the dietitian for Admission Nutrition Risk Screen for unintentional wt loss.    Pt admitted for chest pain and atrial fibrillation with RVR (H).    PMHx of  two prior PVI ablation procedures (2019 and 2021) to treat Afib, morbid obesity, CALLY, paroxysmal atrial fibrillation, traumatic brain injury with recurrent migraines, T2DM w/out complication, HTN, chronic anticoagulation with warfarin.    NUTRITION HISTORY  NKFA    Patient states that she has followed the mediterranean diet before with her  who is from [place]. They have multiple cookbooks at home for this diet. She states that she is well versed in carbohydrate counting. She tends to overeat and likes sweets and \"junk food\". Her usual weight is around 300 lbs. We provided education on the heart healthy diet with low sodium and provided a handout as well. We talked about certain foods that would be beneficial to include in the diet. Patient stated that her weight loss recently was d/t being on lasix and losing water weight.    CURRENT NUTRITION ORDERS  Diet: low saturated fat and <2400mg Na  Intake/Tolerance: 0% - d/t NPO diet previously    125 mL fluid infusion of NaCl0.7, continuous @ 10 mL/h    LABS  Labs reviewed; WDL; Glucose+177 6/7 but WDL 6/9    MEDICATIONS  Medications reviewed; PACERONE, NEURONTIN, DILAUDID, metformin, magnesium oxide    ANTHROPOMETRICS  Height: 172.7 cm (5' 7.992\")  Most Recent Weight: 136 kg (299 lb 12.8 oz)    IBW: 63.6 kg  BMI: Obesity Grade III BMI >40  Weight History:   Wt Readings " from Last 5 Encounters:   06/09/22 136 kg (299 lb 12.8 oz)   04/23/22 137.9 kg (304 lb)   04/10/22 138.2 kg (304 lb 11.2 oz)   01/24/19 139.7 kg (308 lb)   12/13/17 142 kg (313 lb)     No additional relevant wt hx in Care Everywhere. Wt appears stable.     Dosing Weight: 82 kg  ASSESSED NUTRITION NEEDS  Estimated Energy Needs: 3909-9701 kcals/day (20 - 25 kcals/kg)  Justification: Maintenance/Obese  Estimated Protein Needs: 66-82 grams protein/day (0.8 - 1 grams of pro/kg)  Justification: Maintenance  Estimated Fluid Needs: 2482-0086  mL/day (1 mL/kcal)   Justification: Maintenance    Edema: generalized; L/R leg, knee, ankle +1 (trace)  GI: WDL  Skin: WDL    MALNUTRITION:  % Weight Loss:  Weight loss does not meet criteria for malnutrition  % Intake:  Decreased intake does not meet criteria for malnutrition  Subcutaneous Fat Loss:  None observed  Muscle Loss:  None observed  Fluid Retention:  None noted    Malnutrition Diagnosis: Patient does not meet two of the above criteria necessary for diagnosing malnutrition    NUTRITION DIAGNOSIS  Food- and nutrition-related knowledge deficit related to HTN and DM2 as evidenced by HTN and high BG    INTERVENTIONS  Implementation  Nutrition Education: See education flowsheet     Goals  Patient to consume % of nutritionally adequate meals three times per day, or the equivalent with supplements/snacks.     Monitoring/Evaluation  Progress toward goals will be monitored and evaluated per protocol.    Saadia Parr

## 2022-06-10 LAB
ATRIAL RATE - MUSE: 197 BPM
DIASTOLIC BLOOD PRESSURE - MUSE: NORMAL MMHG
INTERPRETATION ECG - MUSE: NORMAL
P AXIS - MUSE: NORMAL DEGREES
PR INTERVAL - MUSE: NORMAL MS
QRS DURATION - MUSE: 70 MS
QT - MUSE: 228 MS
QTC - MUSE: 414 MS
R AXIS - MUSE: 65 DEGREES
SYSTOLIC BLOOD PRESSURE - MUSE: NORMAL MMHG
T AXIS - MUSE: 241 DEGREES
VENTRICULAR RATE- MUSE: 199 BPM

## 2022-06-14 DIAGNOSIS — I48.0 PAROXYSMAL ATRIAL FIBRILLATION (H): Primary | ICD-10-CM

## 2022-07-04 ENCOUNTER — HOSPITAL ENCOUNTER (INPATIENT)
Facility: CLINIC | Age: 47
LOS: 1 days | Discharge: HOME | DRG: 309 | End: 2022-07-05
Attending: EMERGENCY MEDICINE | Admitting: INTERNAL MEDICINE
Payer: COMMERCIAL

## 2022-07-04 DIAGNOSIS — I47.10 SVT (SUPRAVENTRICULAR TACHYCARDIA) (H): ICD-10-CM

## 2022-07-04 DIAGNOSIS — G44.86 HEADACHE, CERVICOGENIC: Primary | ICD-10-CM

## 2022-07-04 DIAGNOSIS — I48.91 ATRIAL FIBRILLATION WITH RAPID VENTRICULAR RESPONSE (H): ICD-10-CM

## 2022-07-04 LAB
ANION GAP SERPL CALCULATED.3IONS-SCNC: 10 MMOL/L (ref 5–18)
BASOPHILS # BLD AUTO: 0 10E3/UL (ref 0–0.2)
BASOPHILS NFR BLD AUTO: 0 %
BNP SERPL-MCNC: 159 PG/ML (ref 0–64)
BUN SERPL-MCNC: 10 MG/DL (ref 8–22)
CALCIUM SERPL-MCNC: 10.2 MG/DL (ref 8.5–10.5)
CHLORIDE BLD-SCNC: 106 MMOL/L (ref 98–107)
CO2 SERPL-SCNC: 25 MMOL/L (ref 22–31)
CREAT SERPL-MCNC: 0.76 MG/DL (ref 0.6–1.1)
EOSINOPHIL # BLD AUTO: 0.2 10E3/UL (ref 0–0.7)
EOSINOPHIL NFR BLD AUTO: 2 %
ERYTHROCYTE [DISTWIDTH] IN BLOOD BY AUTOMATED COUNT: 14.6 % (ref 10–15)
GFR SERPL CREATININE-BSD FRML MDRD: >90 ML/MIN/1.73M2
GLUCOSE BLD-MCNC: 154 MG/DL (ref 70–125)
HCT VFR BLD AUTO: 35.9 % (ref 35–47)
HGB BLD-MCNC: 11.7 G/DL (ref 11.7–15.7)
IMM GRANULOCYTES # BLD: 0 10E3/UL
IMM GRANULOCYTES NFR BLD: 0 %
INR PPP: 2.96 (ref 0.85–1.15)
LYMPHOCYTES # BLD AUTO: 2.6 10E3/UL (ref 0.8–5.3)
LYMPHOCYTES NFR BLD AUTO: 37 %
MAGNESIUM SERPL-MCNC: 1.6 MG/DL (ref 1.8–2.6)
MCH RBC QN AUTO: 26.4 PG (ref 26.5–33)
MCHC RBC AUTO-ENTMCNC: 32.6 G/DL (ref 31.5–36.5)
MCV RBC AUTO: 81 FL (ref 78–100)
MONOCYTES # BLD AUTO: 0.4 10E3/UL (ref 0–1.3)
MONOCYTES NFR BLD AUTO: 6 %
NEUTROPHILS # BLD AUTO: 3.9 10E3/UL (ref 1.6–8.3)
NEUTROPHILS NFR BLD AUTO: 55 %
NRBC # BLD AUTO: 0 10E3/UL
NRBC BLD AUTO-RTO: 0 /100
PLATELET # BLD AUTO: 240 10E3/UL (ref 150–450)
POTASSIUM BLD-SCNC: 3.8 MMOL/L (ref 3.5–5)
RBC # BLD AUTO: 4.43 10E6/UL (ref 3.8–5.2)
SODIUM SERPL-SCNC: 141 MMOL/L (ref 136–145)
TSH SERPL DL<=0.005 MIU/L-ACNC: 4.1 UIU/ML (ref 0.3–5)
WBC # BLD AUTO: 7.2 10E3/UL (ref 4–11)

## 2022-07-04 PROCEDURE — 84484 ASSAY OF TROPONIN QUANT: CPT | Performed by: INTERNAL MEDICINE

## 2022-07-04 PROCEDURE — 36415 COLL VENOUS BLD VENIPUNCTURE: CPT | Performed by: EMERGENCY MEDICINE

## 2022-07-04 PROCEDURE — 99291 CRITICAL CARE FIRST HOUR: CPT | Mod: CS,25

## 2022-07-04 PROCEDURE — U0003 INFECTIOUS AGENT DETECTION BY NUCLEIC ACID (DNA OR RNA); SEVERE ACUTE RESPIRATORY SYNDROME CORONAVIRUS 2 (SARS-COV-2) (CORONAVIRUS DISEASE [COVID-19]), AMPLIFIED PROBE TECHNIQUE, MAKING USE OF HIGH THROUGHPUT TECHNOLOGIES AS DESCRIBED BY CMS-2020-01-R: HCPCS | Performed by: EMERGENCY MEDICINE

## 2022-07-04 PROCEDURE — 80048 BASIC METABOLIC PNL TOTAL CA: CPT | Performed by: EMERGENCY MEDICINE

## 2022-07-04 PROCEDURE — 96375 TX/PRO/DX INJ NEW DRUG ADDON: CPT

## 2022-07-04 PROCEDURE — 83880 ASSAY OF NATRIURETIC PEPTIDE: CPT | Performed by: EMERGENCY MEDICINE

## 2022-07-04 PROCEDURE — 85025 COMPLETE CBC W/AUTO DIFF WBC: CPT | Performed by: EMERGENCY MEDICINE

## 2022-07-04 PROCEDURE — 85610 PROTHROMBIN TIME: CPT | Performed by: EMERGENCY MEDICINE

## 2022-07-04 PROCEDURE — 83735 ASSAY OF MAGNESIUM: CPT | Performed by: EMERGENCY MEDICINE

## 2022-07-04 PROCEDURE — 96365 THER/PROPH/DIAG IV INF INIT: CPT

## 2022-07-04 PROCEDURE — 120N000013 HC R&B IMCU

## 2022-07-04 PROCEDURE — 96366 THER/PROPH/DIAG IV INF ADDON: CPT

## 2022-07-04 PROCEDURE — 250N000011 HC RX IP 250 OP 636: Performed by: EMERGENCY MEDICINE

## 2022-07-04 PROCEDURE — 250N000009 HC RX 250: Performed by: EMERGENCY MEDICINE

## 2022-07-04 PROCEDURE — 84443 ASSAY THYROID STIM HORMONE: CPT | Performed by: EMERGENCY MEDICINE

## 2022-07-04 PROCEDURE — C9803 HOPD COVID-19 SPEC COLLECT: HCPCS

## 2022-07-04 PROCEDURE — 96368 THER/DIAG CONCURRENT INF: CPT

## 2022-07-04 PROCEDURE — 99223 1ST HOSP IP/OBS HIGH 75: CPT | Mod: AI | Performed by: INTERNAL MEDICINE

## 2022-07-04 RX ORDER — DILTIAZEM HCL IN NACL,ISO-OSM 125 MG/125
5-15 PLASTIC BAG, INJECTION (ML) INTRAVENOUS CONTINUOUS
Status: DISCONTINUED | OUTPATIENT
Start: 2022-07-04 | End: 2022-07-05 | Stop reason: HOSPADM

## 2022-07-04 RX ORDER — METOPROLOL TARTRATE 1 MG/ML
5 INJECTION, SOLUTION INTRAVENOUS
Status: COMPLETED | OUTPATIENT
Start: 2022-07-04 | End: 2022-07-04

## 2022-07-04 RX ORDER — MAGNESIUM SULFATE HEPTAHYDRATE 40 MG/ML
2 INJECTION, SOLUTION INTRAVENOUS ONCE
Status: COMPLETED | OUTPATIENT
Start: 2022-07-04 | End: 2022-07-04

## 2022-07-04 RX ADMIN — MAGNESIUM SULFATE HEPTAHYDRATE 2 G: 40 INJECTION, SOLUTION INTRAVENOUS at 22:42

## 2022-07-04 RX ADMIN — METOPROLOL TARTRATE 5 MG: 1 INJECTION, SOLUTION INTRAVENOUS at 22:25

## 2022-07-04 RX ADMIN — METOPROLOL TARTRATE 5 MG: 1 INJECTION, SOLUTION INTRAVENOUS at 22:19

## 2022-07-04 RX ADMIN — METOPROLOL TARTRATE 5 MG: 1 INJECTION, SOLUTION INTRAVENOUS at 22:12

## 2022-07-04 RX ADMIN — Medication 5 MG/HR: at 23:20

## 2022-07-04 ASSESSMENT — ENCOUNTER SYMPTOMS
FEVER: 0
CHILLS: 0
SHORTNESS OF BREATH: 1
PALPITATIONS: 1

## 2022-07-04 ASSESSMENT — ACTIVITIES OF DAILY LIVING (ADL): ADLS_ACUITY_SCORE: 35

## 2022-07-05 ENCOUNTER — ANESTHESIA EVENT (OUTPATIENT)
Dept: SURGERY | Facility: CLINIC | Age: 47
DRG: 309 | End: 2022-07-05
Payer: COMMERCIAL

## 2022-07-05 ENCOUNTER — ANESTHESIA (OUTPATIENT)
Dept: SURGERY | Facility: CLINIC | Age: 47
DRG: 309 | End: 2022-07-05
Payer: COMMERCIAL

## 2022-07-05 ENCOUNTER — APPOINTMENT (OUTPATIENT)
Dept: SURGERY | Facility: CLINIC | Age: 47
DRG: 309 | End: 2022-07-05
Attending: INTERNAL MEDICINE
Payer: COMMERCIAL

## 2022-07-05 VITALS
DIASTOLIC BLOOD PRESSURE: 76 MMHG | HEART RATE: 60 BPM | WEIGHT: 293 LBS | OXYGEN SATURATION: 94 % | BODY MASS INDEX: 44.41 KG/M2 | SYSTOLIC BLOOD PRESSURE: 136 MMHG | TEMPERATURE: 97.6 F | RESPIRATION RATE: 16 BRPM | HEIGHT: 68 IN

## 2022-07-05 LAB
ALBUMIN SERPL-MCNC: 4 G/DL (ref 3.5–5)
ALP SERPL-CCNC: 78 U/L (ref 45–120)
ALT SERPL W P-5'-P-CCNC: 13 U/L (ref 0–45)
ANION GAP SERPL CALCULATED.3IONS-SCNC: 9 MMOL/L (ref 5–18)
AST SERPL W P-5'-P-CCNC: 17 U/L (ref 0–40)
ATRIAL RATE - MUSE: 141 BPM
BASOPHILS # BLD AUTO: 0 10E3/UL (ref 0–0.2)
BASOPHILS NFR BLD AUTO: 1 %
BILIRUB SERPL-MCNC: 0.7 MG/DL (ref 0–1)
BUN SERPL-MCNC: 8 MG/DL (ref 8–22)
CALCIUM SERPL-MCNC: 9.7 MG/DL (ref 8.5–10.5)
CANNABINOIDS UR QL SCN: NORMAL
CHLORIDE BLD-SCNC: 105 MMOL/L (ref 98–107)
CO2 SERPL-SCNC: 25 MMOL/L (ref 22–31)
COCAINE UR QL: NORMAL
CREAT SERPL-MCNC: 0.66 MG/DL (ref 0.6–1.1)
CREAT UR-MCNC: 39 MG/DL
DIASTOLIC BLOOD PRESSURE - MUSE: 111 MMHG
EOSINOPHIL # BLD AUTO: 0.1 10E3/UL (ref 0–0.7)
EOSINOPHIL NFR BLD AUTO: 2 %
ERYTHROCYTE [DISTWIDTH] IN BLOOD BY AUTOMATED COUNT: 14.5 % (ref 10–15)
GFR SERPL CREATININE-BSD FRML MDRD: >90 ML/MIN/1.73M2
GLUCOSE BLD-MCNC: 110 MG/DL (ref 70–125)
GLUCOSE BLDC GLUCOMTR-MCNC: 114 MG/DL (ref 70–99)
GLUCOSE BLDC GLUCOMTR-MCNC: 117 MG/DL (ref 70–99)
GLUCOSE BLDC GLUCOMTR-MCNC: 130 MG/DL (ref 70–99)
HCG UR QL: NEGATIVE
HCT VFR BLD AUTO: 37 % (ref 35–47)
HGB BLD-MCNC: 11.9 G/DL (ref 11.7–15.7)
HOLD SPECIMEN: NORMAL
IMM GRANULOCYTES # BLD: 0 10E3/UL
IMM GRANULOCYTES NFR BLD: 0 %
INR PPP: 2.82 (ref 0.85–1.15)
INTERPRETATION ECG - MUSE: NORMAL
LYMPHOCYTES # BLD AUTO: 2.8 10E3/UL (ref 0.8–5.3)
LYMPHOCYTES NFR BLD AUTO: 44 %
MAGNESIUM SERPL-MCNC: 2 MG/DL (ref 1.8–2.6)
MCH RBC QN AUTO: 26.3 PG (ref 26.5–33)
MCHC RBC AUTO-ENTMCNC: 32.2 G/DL (ref 31.5–36.5)
MCV RBC AUTO: 82 FL (ref 78–100)
MONOCYTES # BLD AUTO: 0.4 10E3/UL (ref 0–1.3)
MONOCYTES NFR BLD AUTO: 6 %
NEUTROPHILS # BLD AUTO: 3.1 10E3/UL (ref 1.6–8.3)
NEUTROPHILS NFR BLD AUTO: 47 %
NRBC # BLD AUTO: 0 10E3/UL
NRBC BLD AUTO-RTO: 0 /100
P AXIS - MUSE: NORMAL DEGREES
PLATELET # BLD AUTO: 222 10E3/UL (ref 150–450)
POTASSIUM BLD-SCNC: 3.7 MMOL/L (ref 3.5–5)
PR INTERVAL - MUSE: NORMAL MS
PROT SERPL-MCNC: 7.5 G/DL (ref 6–8)
QRS DURATION - MUSE: 82 MS
QT - MUSE: 326 MS
QTC - MUSE: 499 MS
R AXIS - MUSE: 59 DEGREES
RBC # BLD AUTO: 4.52 10E6/UL (ref 3.8–5.2)
SARS-COV-2 RNA RESP QL NAA+PROBE: NEGATIVE
SODIUM SERPL-SCNC: 139 MMOL/L (ref 136–145)
SYSTOLIC BLOOD PRESSURE - MUSE: 202 MMHG
T AXIS - MUSE: 14 DEGREES
TROPONIN I SERPL-MCNC: <0.01 NG/ML (ref 0–0.29)
VENTRICULAR RATE- MUSE: 141 BPM
WBC # BLD AUTO: 6.5 10E3/UL (ref 4–11)

## 2022-07-05 PROCEDURE — 80053 COMPREHEN METABOLIC PANEL: CPT | Performed by: INTERNAL MEDICINE

## 2022-07-05 PROCEDURE — 36415 COLL VENOUS BLD VENIPUNCTURE: CPT | Performed by: INTERNAL MEDICINE

## 2022-07-05 PROCEDURE — 370N000017 HC ANESTHESIA TECHNICAL FEE, PER MIN

## 2022-07-05 PROCEDURE — 250N000009 HC RX 250: Performed by: NURSE ANESTHETIST, CERTIFIED REGISTERED

## 2022-07-05 PROCEDURE — 250N000013 HC RX MED GY IP 250 OP 250 PS 637: Performed by: INTERNAL MEDICINE

## 2022-07-05 PROCEDURE — 5A2204Z RESTORATION OF CARDIAC RHYTHM, SINGLE: ICD-10-PCS | Performed by: INTERNAL MEDICINE

## 2022-07-05 PROCEDURE — 81025 URINE PREGNANCY TEST: CPT | Performed by: INTERNAL MEDICINE

## 2022-07-05 PROCEDURE — 80307 DRUG TEST PRSMV CHEM ANLYZR: CPT | Performed by: INTERNAL MEDICINE

## 2022-07-05 PROCEDURE — 85610 PROTHROMBIN TIME: CPT | Performed by: INTERNAL MEDICINE

## 2022-07-05 PROCEDURE — 99239 HOSP IP/OBS DSCHRG MGMT >30: CPT | Performed by: INTERNAL MEDICINE

## 2022-07-05 PROCEDURE — 92960 CARDIOVERSION ELECTRIC EXT: CPT | Performed by: INTERNAL MEDICINE

## 2022-07-05 PROCEDURE — 99222 1ST HOSP IP/OBS MODERATE 55: CPT | Mod: 25 | Performed by: INTERNAL MEDICINE

## 2022-07-05 PROCEDURE — 92960 CARDIOVERSION ELECTRIC EXT: CPT

## 2022-07-05 PROCEDURE — 258N000003 HC RX IP 258 OP 636: Performed by: NURSE ANESTHETIST, CERTIFIED REGISTERED

## 2022-07-05 PROCEDURE — 258N000003 HC RX IP 258 OP 636: Performed by: INTERNAL MEDICINE

## 2022-07-05 PROCEDURE — 85025 COMPLETE CBC W/AUTO DIFF WBC: CPT | Performed by: INTERNAL MEDICINE

## 2022-07-05 PROCEDURE — 83735 ASSAY OF MAGNESIUM: CPT | Performed by: INTERNAL MEDICINE

## 2022-07-05 RX ORDER — ACETAMINOPHEN 325 MG/1
650 TABLET ORAL EVERY 6 HOURS PRN
COMMUNITY
Start: 2022-07-05

## 2022-07-05 RX ORDER — HYDROMORPHONE HCL IN WATER/PF 6 MG/30 ML
.2-.4 PATIENT CONTROLLED ANALGESIA SYRINGE INTRAVENOUS EVERY 4 HOURS PRN
Status: DISCONTINUED | OUTPATIENT
Start: 2022-07-05 | End: 2022-07-05 | Stop reason: HOSPADM

## 2022-07-05 RX ORDER — DEXTROSE MONOHYDRATE 25 G/50ML
25-50 INJECTION, SOLUTION INTRAVENOUS
Status: DISCONTINUED | OUTPATIENT
Start: 2022-07-05 | End: 2022-07-05 | Stop reason: HOSPADM

## 2022-07-05 RX ORDER — ACETAMINOPHEN 650 MG/1
650 SUPPOSITORY RECTAL EVERY 6 HOURS PRN
Status: DISCONTINUED | OUTPATIENT
Start: 2022-07-05 | End: 2022-07-05 | Stop reason: HOSPADM

## 2022-07-05 RX ORDER — WARFARIN SODIUM 5 MG/1
5 TABLET ORAL DAILY
Status: DISCONTINUED | OUTPATIENT
Start: 2022-07-05 | End: 2022-07-05 | Stop reason: HOSPADM

## 2022-07-05 RX ORDER — NALOXONE HYDROCHLORIDE 0.4 MG/ML
0.4 INJECTION, SOLUTION INTRAMUSCULAR; INTRAVENOUS; SUBCUTANEOUS
Status: DISCONTINUED | OUTPATIENT
Start: 2022-07-05 | End: 2022-07-05 | Stop reason: HOSPADM

## 2022-07-05 RX ORDER — MAGNESIUM OXIDE 400 MG/1
400 TABLET ORAL AT BEDTIME
Status: DISCONTINUED | OUTPATIENT
Start: 2022-07-05 | End: 2022-07-05 | Stop reason: HOSPADM

## 2022-07-05 RX ORDER — SODIUM CHLORIDE, SODIUM LACTATE, POTASSIUM CHLORIDE, CALCIUM CHLORIDE 600; 310; 30; 20 MG/100ML; MG/100ML; MG/100ML; MG/100ML
INJECTION, SOLUTION INTRAVENOUS CONTINUOUS PRN
Status: DISCONTINUED | OUTPATIENT
Start: 2022-07-05 | End: 2022-07-05

## 2022-07-05 RX ORDER — NALOXONE HYDROCHLORIDE 0.4 MG/ML
0.2 INJECTION, SOLUTION INTRAMUSCULAR; INTRAVENOUS; SUBCUTANEOUS
Status: DISCONTINUED | OUTPATIENT
Start: 2022-07-05 | End: 2022-07-05 | Stop reason: HOSPADM

## 2022-07-05 RX ORDER — ACETAMINOPHEN 325 MG/1
975 TABLET ORAL ONCE
Status: COMPLETED | OUTPATIENT
Start: 2022-07-05 | End: 2022-07-05

## 2022-07-05 RX ORDER — ACETAMINOPHEN 500 MG
1000 TABLET ORAL EVERY 6 HOURS PRN
Status: DISCONTINUED | OUTPATIENT
Start: 2022-07-05 | End: 2022-07-05 | Stop reason: HOSPADM

## 2022-07-05 RX ORDER — GABAPENTIN 400 MG/1
400 CAPSULE ORAL 2 TIMES DAILY
Status: DISCONTINUED | OUTPATIENT
Start: 2022-07-05 | End: 2022-07-05 | Stop reason: HOSPADM

## 2022-07-05 RX ORDER — SODIUM CHLORIDE 9 MG/ML
INJECTION, SOLUTION INTRAVENOUS CONTINUOUS
Status: DISCONTINUED | OUTPATIENT
Start: 2022-07-05 | End: 2022-07-05

## 2022-07-05 RX ORDER — GABAPENTIN 600 MG/1
600 TABLET ORAL ONCE
Status: COMPLETED | OUTPATIENT
Start: 2022-07-05 | End: 2022-07-05

## 2022-07-05 RX ORDER — WARFARIN SODIUM 5 MG/1
5 TABLET ORAL ONCE
Status: COMPLETED | OUTPATIENT
Start: 2022-07-05 | End: 2022-07-05

## 2022-07-05 RX ORDER — GABAPENTIN 300 MG/1
600 CAPSULE ORAL AT BEDTIME
Status: DISCONTINUED | OUTPATIENT
Start: 2022-07-05 | End: 2022-07-05 | Stop reason: HOSPADM

## 2022-07-05 RX ORDER — AMIODARONE HYDROCHLORIDE 200 MG/1
200 TABLET ORAL 2 TIMES DAILY
Status: DISCONTINUED | OUTPATIENT
Start: 2022-07-05 | End: 2022-07-05

## 2022-07-05 RX ORDER — NICOTINE POLACRILEX 4 MG
15-30 LOZENGE BUCCAL
Status: DISCONTINUED | OUTPATIENT
Start: 2022-07-05 | End: 2022-07-05 | Stop reason: HOSPADM

## 2022-07-05 RX ORDER — LIDOCAINE 40 MG/G
CREAM TOPICAL
Status: DISCONTINUED | OUTPATIENT
Start: 2022-07-05 | End: 2022-07-05 | Stop reason: HOSPADM

## 2022-07-05 RX ORDER — AMIODARONE HYDROCHLORIDE 200 MG/1
200 TABLET ORAL 2 TIMES DAILY
Status: DISCONTINUED | OUTPATIENT
Start: 2022-07-05 | End: 2022-07-05 | Stop reason: HOSPADM

## 2022-07-05 RX ORDER — METOPROLOL TARTRATE 25 MG/1
12.5 TABLET, FILM COATED ORAL 2 TIMES DAILY
Qty: 30 TABLET | Refills: 0
Start: 2022-07-05 | End: 2022-08-26

## 2022-07-05 RX ORDER — ACETAMINOPHEN 325 MG/1
650 TABLET ORAL EVERY 6 HOURS PRN
Status: DISCONTINUED | OUTPATIENT
Start: 2022-07-05 | End: 2022-07-05 | Stop reason: HOSPADM

## 2022-07-05 RX ADMIN — METHOHEXITAL SODIUM 100 MG: 500 INJECTION, POWDER, LYOPHILIZED, FOR SOLUTION INTRAMUSCULAR; INTRAVENOUS; RECTAL at 12:15

## 2022-07-05 RX ADMIN — ACETAMINOPHEN 650 MG: 325 TABLET ORAL at 07:57

## 2022-07-05 RX ADMIN — WARFARIN SODIUM 5 MG: 5 TABLET ORAL at 02:24

## 2022-07-05 RX ADMIN — AMIODARONE HYDROCHLORIDE 200 MG: 200 TABLET ORAL at 10:51

## 2022-07-05 RX ADMIN — GABAPENTIN 400 MG: 400 CAPSULE ORAL at 15:23

## 2022-07-05 RX ADMIN — GABAPENTIN 600 MG: 600 TABLET, FILM COATED ORAL at 02:24

## 2022-07-05 RX ADMIN — SODIUM CHLORIDE, POTASSIUM CHLORIDE, SODIUM LACTATE AND CALCIUM CHLORIDE: 600; 310; 30; 20 INJECTION, SOLUTION INTRAVENOUS at 12:10

## 2022-07-05 RX ADMIN — SODIUM CHLORIDE: 9 INJECTION, SOLUTION INTRAVENOUS at 00:46

## 2022-07-05 RX ADMIN — HYDROMORPHONE HYDROCHLORIDE 2 MG: 2 TABLET ORAL at 12:57

## 2022-07-05 RX ADMIN — METHOHEXITAL SODIUM 20 MG: 500 INJECTION, POWDER, LYOPHILIZED, FOR SOLUTION INTRAMUSCULAR; INTRAVENOUS; RECTAL at 12:16

## 2022-07-05 RX ADMIN — ACETAMINOPHEN 975 MG: 325 TABLET ORAL at 02:24

## 2022-07-05 RX ADMIN — Medication 5 MG: at 02:25

## 2022-07-05 ASSESSMENT — ACTIVITIES OF DAILY LIVING (ADL)
CHANGE_IN_FUNCTIONAL_STATUS_SINCE_ONSET_OF_CURRENT_ILLNESS/INJURY: NO
DIFFICULTY_EATING/SWALLOWING: NO
HEARING_DIFFICULTY_OR_DEAF: NO
TOILETING_ISSUES: NO
FALL_HISTORY_WITHIN_LAST_SIX_MONTHS: NO
ADLS_ACUITY_SCORE: 20
DRESSING/BATHING_DIFFICULTY: NO
WEAR_GLASSES_OR_BLIND: YES
ADLS_ACUITY_SCORE: 20
ADLS_ACUITY_SCORE: 35
VISION_MANAGEMENT: GLASSES
DOING_ERRANDS_INDEPENDENTLY_DIFFICULTY: NO
ADLS_ACUITY_SCORE: 20
ADLS_ACUITY_SCORE: 35
CONCENTRATING,_REMEMBERING_OR_MAKING_DECISIONS_DIFFICULTY: NO
DIFFICULTY_COMMUNICATING: NO
ADLS_ACUITY_SCORE: 20
WALKING_OR_CLIMBING_STAIRS_DIFFICULTY: NO
ADLS_ACUITY_SCORE: 20

## 2022-07-05 ASSESSMENT — ENCOUNTER SYMPTOMS: DYSRHYTHMIAS: 1

## 2022-07-05 NOTE — PROVIDER NOTIFICATION
1601: Per cardiology, julio césar Roland to discharge.  Page sent to MD. Awaiting response. Maryam Rucker RN    1700: Spoke with julio césar Barahona to discharge.  Orders being placed. Maryam Rucker RN

## 2022-07-05 NOTE — ANESTHESIA CARE TRANSFER NOTE
Patient: Milagro Thomas    Procedure: * No procedures listed *       Diagnosis: * No pre-op diagnosis entered *  Diagnosis Additional Information: No value filed.    Anesthesia Type:   General     Note:    Oropharynx: oropharynx clear of all foreign objects  Level of Consciousness: awake  Oxygen Supplementation: face mask  Level of Supplemental Oxygen (L/min / FiO2): 8  Independent Airway: airway patency satisfactory and stable  Dentition: dentition unchanged  Vital Signs Stable: post-procedure vital signs reviewed and stable  Report to RN Given: handoff report given  Patient transferred to: PACU    Handoff Report: Identifed the Patient, Identified the Reponsible Provider, Reviewed the pertinent medical history, Discussed the surgical course, Reviewed Intra-OP anesthesia mangement and issues during anesthesia, Set expectations for post-procedure period and Allowed opportunity for questions and acknowledgement of understanding      Vitals:  Vitals Value Taken Time   /81 07/05/22 1220   Temp     Pulse 82 07/05/22 1220   Resp 24 07/05/22 1220   SpO2 99 % 07/05/22 1220       Electronically Signed By: EARL Davis CRNA  July 5, 2022  12:20 PM

## 2022-07-05 NOTE — PLAN OF CARE
Patient discharged. Discharge instructions reviewed and understood with patient.  Escorted to main entrance via wheelchair, accompanied by PCA.  Family to transport. Maryam Rucker RN

## 2022-07-05 NOTE — PROCEDURES
Patient brought to the PACU region in the operating room area.  She was appropriately n.p.o. and anticoagulation status was appropriate for cardioversion.  The indication for the study is recurrent atrial fibrillation.    Patient was placed under brief general anesthesia by the anesthesiologist and the nurse anesthetist.  With anterior and posterior pelvis she was shocked with 200 J of synchronized cardioversion with continued atrial fibrillation.  She was then we shocked with 300 J of synchronized shocking with restoration of sinus rhythm.  Patient waking up at the time of the dictation.    Plans will be to continue her amiodarone and low-dose metoprolol and if she is stable later today could potentially be discharged and have follow-up with her cardiologist at [facility].

## 2022-07-05 NOTE — PLAN OF CARE
89  Goal: Optimal Comfort and Wellbeing  7/5/2022 0339 by Naomi Gregory RN  Outcome: Ongoing, Progressing    Patient on Diltiazem gtt running at 10 mg/hr.  HR currently in the 70's to 80's.  A-fibrillation on telemetry.  VSS.  Afebrile.  On home CPAP overnight on room air.  No c/o of pain or discomfort.  No prn medications given.  Pt. NPO for possible procedure in a.m.  Up independently to bathroom.  All cares performed as ordered and explained.  Will continue to monitor and follow plan of care.

## 2022-07-05 NOTE — DISCHARGE SUMMARY
[facility]    Discharge Summary  Hospitalist    Date of Admission:  7/4/2022  Date of Discharge:  7/5/2022  5:36 PM  Provider:  Sujata Kruse DO    Discharge Diagnoses   1.  Poorly tolerated, recurrent a fib  2.  H/o recurrent SVT    Other medical issues:  Past Medical History:   Diagnosis Date     Essential hypertension      Morbid obesity (H)      CALLY (obstructive sleep apnea)      Paroxysmal atrial fibrillation (H)     s/p ablation x2     TBI (traumatic brain injury) (H)      Type 2 diabetes mellitus without complication, without long-term current use of insulin (H)      Chronic anticoagulation with warfarin ( a fib)  Migraine headaches    History of Present Illness   Milagro Thomas is an 46 year old female who presented with sudden onset of palpitaitons.  Please see the admission history and physical for full details.    Hospital Course   Milagro Thomas was admitted on 7/4/2022.  The following problems were addressed during her hospitalization:    1.  Poorly tolerated, recurrent a fib    Ms. Motta has a PMH of recurrent a fib and SVT that have been very symptomatic and poorly tolerated.  She was recently hospitalized 6/7/6/9/22 for tachyarrythmis and required direct cardioversion d/t persistent tachyarrythmia despite IV amiodarone and IV diltiazem.    She returned to the ED again with palpitations and noted to be a fib with RVR.  She was given IV diltiazem and started on a diltiazem gtt.  Cardiology was consulted; recommended restarting PTA po amiodarone and metoprolol dosing (pt had recently decreased her PTA metoprolol dose d/t noted bradycardia).  She underwent elective cardioversion and she was discharged in stable condition with close outpt cardiology follow-up.      She is on chronic warfarin. Her INR was within therapeutic range and no changes were made to her warfarin dosing.       Significant Results and Procedures   Direct cardioversion - 7/5/22    Pending  Results   Unresulted Labs Ordered in the Past 30 Days of this Admission     No orders found for last 31 day(s).          Code Status   Full Code       Primary Care Physician   DIRK LEWIS    Physical Exam   Temp: 97.6  F (36.4  C) Temp src: Oral BP: 136/76 Pulse: 60   Resp: 16 SpO2: 94 % O2 Device: None (Room air) Oxygen Delivery: 4 LPM  Vitals:    07/04/22 2130 07/05/22 0023   Weight: 136 kg (299 lb 13.2 oz) 138.5 kg (305 lb 6.4 oz)     Vital Signs with Ranges  Temp:  [97.1  F (36.2  C)-98.5  F (36.9  C)] 97.6  F (36.4  C)  Pulse:  [] 60  Resp:  [16-52] 16  BP: (115-202)/() 136/76  SpO2:  [84 %-100 %] 94 %  I/O last 3 completed shifts:  In: 1086.92 [P.O.:120; I.V.:966.92]  Out: 400 [Urine:400]    PT SEEN AND EXAMINED ON DAY OF D/C  GEN:  Alert, oriented x 3, in darkened room with sunglasses on  HEENT:  Normocephalic/atraumatic, no scleral icterus, no nasal discharge, mouth and membranes appear fairly moist  NECK:  No clear thyromegaly  CV:  irregular rate and rhythm, no loud murmur to ausc.  S1 + S2 noted, no S3 or S4.  LUNGS:  Clear to auscultation ant/lat bilaterally.  No c;lear rales/rhonchi/wheezing auscultated bilaterally.  No costal retractions bilaterally.  Symmetric chest rise on inhalation noted.  ABD:  Active bowel sounds, soft, non-tender/non-distended.  No rebound/guarding/rigidity.  No masses or clear HSM although exam limited secondary to her body habitus.  EXT:  No significant pretibial edema or cyanosis bilaterally. No joint synovitis noted.  No calf-tenderness or asymmetry noted.  SKIN:  Dry to touch, no rashes or jaundice noted.  PSYCH:  Mood appropriate, Not tearful or depressed.  cooperative  NEURO:  No tremors at rest, speech is clear and appropriate.  Moving all ext without clear difficulty or deficits    Discharge Disposition   Discharged to home    Consultations This Hospital Stay   CARDIOLOGY IP CONSULT  PHARMACY IP CONSULT    Time Spent on this Encounter   Sujata MOJICA  DO Uriah, personally saw the patient today and spent greater than 30 minutes discharging this patient.    Discharge Orders      Reason for your hospital stay    You were admitted with a fib RVR that required electrocardioversion     Follow-up and recommended labs and tests     F/up with electrophysiologist at [facility] in 1-2 wks for hospital f/up  Your INR is within goal range - f/up as scheduled with your next outpt INR     Activity    Your activity upon discharge: activity as tolerated and no driving for today     Diet    Follow this diet upon discharge:    Combination Diet 2 gm NA Diet; Low Saturated Fat Na <2400mg Diet     Discharge Medications   Current Discharge Medication List      START taking these medications    Details   !! acetaminophen (TYLENOL) 325 MG tablet Take 2 tablets (650 mg) by mouth every 6 hours as needed for mild pain or other (and adjunct with moderate or severe pain or per patient request)    Associated Diagnoses: Headache, cervicogenic       !! - Potential duplicate medications found. Please discuss with provider.      CONTINUE these medications which have CHANGED    Details   metoprolol tartrate (LOPRESSOR) 25 MG tablet Take 0.5 tablets (12.5 mg) by mouth 2 times daily  Qty: 30 tablet, Refills: 0    Comments: This is not a new med  Just documenting the correct dose that you were taking PTA  No new rx needed  Associated Diagnoses: SVT (supraventricular tachycardia) (H)         CONTINUE these medications which have NOT CHANGED    Details   !! acetaminophen (TYLENOL) 500 MG tablet Take 1,000 mg by mouth every 6 hours      amiodarone (PACERONE) 200 MG tablet Take 1 tablet (200 mg) by mouth 2 times daily  Qty: 60 tablet, Refills: 0    Associated Diagnoses: Paroxysmal atrial fibrillation (H)      clobetasol (TEMOVATE) 0.05 % cream [CLOBETASOL (TEMOVATE) 0.05 % CREAM] Apply 1 application topically 2 (two) times a day as needed (eczema).       !! gabapentin (NEURONTIN) 100 MG  capsule Take 400 mg by mouth 2 times daily AM and afternoon      !! gabapentin (NEURONTIN) 300 MG capsule Take 600 mg by mouth At Bedtime      HEMP OIL OR EXTRACT OR OTHER CBD CANNABINOID, NOT MEDICAL CANNABIS, Take 1 capsule by mouth At Bedtime      hydrocortisone 1 % cream [HYDROCORTISONE 1 % CREAM] Apply 1 application topically 2 (two) times a day as needed (itching).             levonorgestrel (MIRENA) 20 mcg/24 hr (5 years) IUD [LEVONORGESTREL (MIRENA) 20 MCG/24 HR (5 YEARS) IUD] 1 Device by Intrauterine route.      Magnesium Oxide 250 MG TABS Take 500 mg by mouth At Bedtime      Melatonin 10 MG CAPS Take 10 mg by mouth At Bedtime      Melatonin 5 MG CHEW Take 5 mg by mouth At Bedtime       metFORMIN (GLUCOPHAGE-XR) 500 MG 24 hr tablet [METFORMIN (GLUCOPHAGE-XR) 500 MG 24 HR TABLET] Take 1,000 mg by mouth every evening.      ondansetron (ZOFRAN) 4 MG tablet Take 8 mg by mouth every 8 hours as needed for nausea      tiZANidine (ZANAFLEX) 4 MG tablet Take 4 mg by mouth every 8 hours as needed for muscle spasms      warfarin (COUMADIN/JANTOVEN) 5 MG tablet Take 5 mg by mouth daily       !! - Potential duplicate medications found. Please discuss with provider.        Allergies   Allergies   Allergen Reactions     Apixaban Other (See Comments)     Other reaction(s): menorrhagia  Menorrhagia       Prednisone Palpitations     Data   Recent Labs   Lab 07/05/22  0548 07/04/22  2143   WBC 6.5 7.2   HGB 11.9 11.7   HCT 37.0 35.9   MCV 82 81    240     Recent Labs   Lab 07/05/22  1311 07/05/22  0753 07/05/22  0548 07/05/22  0218 07/04/22  2143   NA  --   --  139  --  141   POTASSIUM  --   --  3.7  --  3.8   CHLORIDE  --   --  105  --  106   CO2  --   --  25  --  25   ANIONGAP  --   --  9  --  10   * 114* 110   < > 154*   BUN  --   --  8  --  10   CR  --   --  0.66  --  0.76   GFRESTIMATED  --   --  >90  --  >90   ANJU  --   --  9.7  --  10.2    < > = values in this interval not displayed.     No results for  input(s): NTBNPI, NTBNP in the last 168 hours.  Recent Labs   Lab 07/04/22  2143   TSH 4.10     No results for input(s): TROPONIN, TROPI, TROPR, TROPONINIS in the last 168 hours.    Invalid input(s): TROP, TROPONINIES, TNIH

## 2022-07-05 NOTE — ED TRIAGE NOTES
PT presents with tachycardia. History of SVT and A-fib.     Triage Assessment     Row Name 07/04/22 0857       Triage Assessment (Adult)    Airway WDL WDL       Respiratory WDL    Respiratory WDL WDL       Skin Circulation/Temperature WDL    Skin Circulation/Temperature WDL WDL       Cardiac WDL    Cardiac WDL X;rhythm    Cardiac Rhythm tachycardic;radial pulse irregular       Peripheral/Neurovascular WDL    Peripheral Neurovascular WDL WDL       Cognitive/Neuro/Behavioral WDL    Cognitive/Neuro/Behavioral WDL WDL

## 2022-07-05 NOTE — PROGRESS NOTES
[facility]  Hospitalist Progress Note    Admit Date:  7/4/2022  Date of Service (when I saw the patient): 07/05/2022   Provider:  Sujata Kruse DO    Assessment & Plan   Milagro Thomas is a 46 year old female with a h/o recurrent a fib and SVT who was admitted on 7/4/2022.     Problem List:  1. Poorly tolerated, recurrent atrial fib    She was recently hospitalized 6/7-6/9/22 d/t tachyarrythmia - at that time she required cardioversion for persist a fib with RVR despite IV amiodarone and IV diltiazem    She has been on po amiodarone bid and metoprolol 12.5mg po bid (pt noted to have decreased her dose of metoprolol d/t bradycardia on pulse checks)    She was given 3 doses of IV metoprolol and then started on a cardizem gtt (currently at 5mg/hr)     She has been continued on her po bid amidarone    Cardiology consult requested and is pending for further recommendations    Unclear what exacerbated her tachyarrythmia PTA    She had low magnesium levels and was given IV replacement in the ED    2. DM2 --   hold metformin risk of acidosis  - sliding scale insulin/FS     3. CALLY   on home CPAP     4. On coumadin for Afib  - pharm for coumadin dosing     5.  Migraine headaches    Currently struggling with a migraine ha    Here PTA Tizanidine has been placed on hold while her QTc interval is being monitored    Po prn narcotics prn for headache until not NPO and home meds can be sorted out.      Diet: NPO for Medical/Clinical Reasons Except for: Meds, Ice Chips    DVT Prophylaxis: Warfarin  Holman Catheter: Not present  Code Status: Full Code      Disposition Plan   Awaiting cardiology consult  Entered: Sujata Kruse DO 07/05/2022, 7:45 AM     The patient's care was discussed with the Bedside Nurse and Patient.    We are operating under sub optimal conditions in the setting of a world wide pandemic, hospitals are running at full capacity with limited bed availability. We are  providing the best possible patient care with limited resources.    Interval History   Having a pounding headache similar to prior migraines.  No palpitations or chest pain/pressure currently.  No N/V - currently NPO.  No recent F/C prior to admission.  No recent cough or SOB.    -Data reviewed today: I reviewed all new labs and imaging results over the last 24 hours. I personally reviewed no images or EKG's today.    Physical Exam   Temp: 98.5  F (36.9  C) Temp src: Oral BP: 134/84 Pulse: 96   Resp: 20 SpO2: 96 % O2 Device: None (Room air)    Vitals:    07/04/22 2130 07/05/22 0023   Weight: 136 kg (299 lb 13.2 oz) 138.5 kg (305 lb 6.4 oz)     Vital Signs with Ranges  Temp:  [98  F (36.7  C)-98.5  F (36.9  C)] 98.5  F (36.9  C)  Pulse:  [] 96  Resp:  [19-29] 20  BP: (134-202)/() 134/84  SpO2:  [95 %-98 %] 96 %  I/O last 3 completed shifts:  In: 66.92 [I.V.:66.92]  Out: -     GEN:  Alert, oriented x 3, in darkened room with sunglasses on  HEENT:  Normocephalic/atraumatic, no scleral icterus, no nasal discharge, mouth and membranes appear fairly moist  NECK:  No clear thyromegaly  CV:  irregular rate and rhythm, no loud murmur to ausc.  S1 + S2 noted, no S3 or S4.  LUNGS:  Clear to auscultation ant/lat bilaterally.  No c;lear rales/rhonchi/wheezing auscultated bilaterally.  No costal retractions bilaterally.  Symmetric chest rise on inhalation noted.  ABD:  Active bowel sounds, soft, non-tender/non-distended.  No rebound/guarding/rigidity.  No masses or clear HSM although exam limited secondary to her body habitus.  EXT:  No significant pretibial edema or cyanosis bilaterally. No joint synovitis noted.  No calf-tenderness or asymmetry noted.  SKIN:  Dry to touch, no rashes or jaundice noted.  PSYCH:  Mood appropriate, Not tearful or depressed.  cooperative  NEURO:  No tremors at rest, speech is clear and appropriate.  Moving all ext without clear difficulty or deficits    Data   Labs:  Recent Labs   Lab  07/05/22 0753 07/05/22 0548 07/05/22 0218 07/04/22 2143   NA  --  139  --  141   POTASSIUM  --  3.7  --  3.8   CHLORIDE  --  105  --  106   CO2  --  25  --  25   ANIONGAP  --  9  --  10   * 110 117* 154*   BUN  --  8  --  10   CR  --  0.66  --  0.76   GFRESTIMATED  --  >90  --  >90   ANJU  --  9.7  --  10.2     Recent Labs   Lab 07/05/22 0548 07/04/22 2143   WBC 6.5 7.2   HGB 11.9 11.7   HCT 37.0 35.9   MCV 82 81    240     Recent Labs   Lab 07/05/22 0753 07/05/22 0548 07/05/22 0218 07/04/22 2143   NA  --  139  --  141   POTASSIUM  --  3.7  --  3.8   CHLORIDE  --  105  --  106   CO2  --  25  --  25   ANIONGAP  --  9  --  10   * 110 117* 154*   BUN  --  8  --  10   CR  --  0.66  --  0.76   GFRESTIMATED  --  >90  --  >90   ANJU  --  9.7  --  10.2   MAG  --  2.0  --  1.6*   PROTTOTAL  --  7.5  --   --    ALBUMIN  --  4.0  --   --    BILITOTAL  --  0.7  --   --    ALKPHOS  --  78  --   --    AST  --  17  --   --    ALT  --  13  --   --       Recent Imaging:   No results found for this or any previous visit (from the past 24 hour(s)).    Medications     dilTIAZem HCl-Sodium Chloride 5 mg/hr (07/05/22 0633)     - MEDICATION INSTRUCTIONS -         insulin aspart  1-7 Units Subcutaneous TID AC     insulin aspart  1-5 Units Subcutaneous At Bedtime     sodium chloride (PF)  3 mL Intracatheter Q8H

## 2022-07-05 NOTE — ED PROVIDER NOTES
EMERGENCY DEPARTMENT ENCOUNTER      NAME: Milagro Thomas  AGE: 46 year old female  YOB: 1975  MRN: [id number]  EVALUATION DATE & TIME: 7/4/2022  9:22 PM    PCP: Misha Stevenson    ED PROVIDER: Renu Andrea M.D.      Chief Complaint   Patient presents with     Tachycardia         FINAL IMPRESSION:  1. Atrial fibrillation with rapid ventricular response (H)        MEDICAL DECISION MAKING:    Pertinent Labs & Imaging studies reviewed. (See chart for details)  ED Course as of 07/04/22 2247   Mon Jul 04, 2022   2140 Afebrile.  Vital signs here with tachycardia, hypertension, tachypnea.  Some of this is related to some anxiety secondary to her being in atrial fibrillation.  Patient is coming in with rapid heart rate.  Started earlier this evening.  Heart rates in the 180s at home.  Recent hospitalization for SVT converting to A. fib after adenosine.  She was started on a dill drip and was admitted.  Seen by cardiology, she has had 2 previous ablations in the past and she is scheduled for an ablation done next week.  Going to Dr. Jefferson's note he is concerned that she has an AV reentry alcira abnormality which is likely causing her to continuously flip into these rhythms.  She did convert back to sinus rhythm after cardioversion however she does not want cardioversion here in the emergency department.  I did offer this to the patient as she is on warfarin but she states that she would like to try medications and if this does not work she would like to be admitted.    Exam for patient here pertinent for mildly anxious, tachycardia with irregularly irregular rhythm but otherwise unremarkable.    Patient's initial EKG here shows A. fib with RVR at a rate of 141.  She does have some ST depressions noted in inferior lateral leads without any ST depressions.  QTC is 499.  Normal axis.  As compared with previous EKG from June 7, 2022 SVT noted at that time with rates at 199.    When she was admitted last time  she did get echocardiogram, she is scheduled to follow-up with the EP.  Cardiology previously had had her on metoprolol, we will try metoprolol 5 mg IV x3.  If this does not work we will start a diltiazem drip which was she was on previously.   2142 Of note when she did know her rate was in the 120s she did take the 240 mg tablet of diltiazem prior to coming in.   2218 Difficult time getting an IV, patient getting her first dose of metoprolol now.  Magnesium slightly low 1.6, will replete.       3 doses of metoprolol without significant improvement though heart rate in the still in A. fib with RVR rates in the 120s to 130s.  Did start a diltiazem drip.  Called and spoke with hospitalist for admission.  Patient continues to do well, still complains of some mild shortness of breath with tachycardia.    Critical care: 60 minutes excluding separately billable procedures.  Includes bedside management, time reviewing test results, review of records, discussing the case with staff, documenting the medical record and time spent with family members (or surrogate decision makers) discussing specific treatment issues.          ED COURSE:    9:30 PM I met with the patient, obtained history, performed an initial exam, and discussed options and plan for diagnostics and treatment here in the ED.    The importance of close follow up was discussed. We reviewed warning signs and symptoms, and I instructed Ms. Thomas to return to the emergency department immediately if she develops any new or worsening symptoms. I provided additional verbal discharge instructions. Ms. Thomas expressed understanding and agreement with this plan of care, her questions were answered, and she was discharged in stable condition.     MEDICATIONS GIVEN IN THE EMERGENCY:  Medications   magnesium sulfate 2 g in water intermittent infusion (2 g Intravenous New Bag 7/4/22 3942)   diltiazem (CARDIZEM) 125 mg in sodium chloride 0.7 % 125 mL infusion (has no  administration in time range)   metoprolol (LOPRESSOR) injection 5 mg (5 mg Intravenous Given 7/4/22 7335)       NEW PRESCRIPTIONS STARTED AT TODAY'S ER VISIT:  New Prescriptions    No medications on file          =================================================================    HPI    Patient information was obtained from: Patient    Use of : N/A         Milagro Thomas is a 46 year old female who presents via private auto for evaluation of tachycardia.     Upon chart review: Patient was hospitalized 6/7/22-6/9/22 due to tachycardia. During hospital course, she had converted to sinus rhythm transiently and then developed A. Fib RVR. She was started on amiodarone drip, IV diltiazem. Heart rates continued to remain high and patient was cardioverted on 6/9/22. Patient was cleared by cardiology and discharged. She was instructed to follow-up with A. Fib clinic and EP clinic as outpatient.     Patient reports onset of tachycardia tonight that has since persisted without improvement. She notes associated symptoms of shortness of breath during time of onset. Exertion worsens symptoms. At ~7:00PM patient took 240 mg of diltiazem prior to arrival which brought her heart rate from 180 bpm to 120 bpm. She currently denies any associated symptoms of localized or radiating chest pain but does endorse in shortness of breath.     Patient does have a history of being hospitalized for A-fib, with the most recent hospitalization being 6/7/22. She explains dealing with A-fib for the past 3 months. During hospitalization patient works that symptoms were resolved after cardioversion. Patient is on warfarin. Patient was prescribed amiodarone following last discharge from hospital. She has been taking all medications as prescribed. Patient denies fevers, chills, or any other complaints at this time.     REVIEW OF SYSTEMS   Review of Systems   Constitutional: Negative for chills and fever.   Respiratory: Positive for  shortness of breath.    Cardiovascular: Positive for palpitations. Negative for chest pain.   All other systems reviewed and are negative.      PAST MEDICAL HISTORY:  Past Medical History:   Diagnosis Date     Essential hypertension      Morbid obesity (H)      CALLY (obstructive sleep apnea)      Paroxysmal atrial fibrillation (H)     s/p ablation x2     TBI (traumatic brain injury) (H)      Type 2 diabetes mellitus without complication, without long-term current use of insulin (H)        PAST SURGICAL HISTORY:  Past Surgical History:   Procedure Laterality Date      SECTION      x2     CHOLECYSTECTOMY       FOOT SURGERY      bone spur and reconstruction       CURRENT MEDICATIONS:      Current Facility-Administered Medications:      diltiazem (CARDIZEM) 125 mg in sodium chloride 0.7 % 125 mL infusion, 5-15 mg/hr, Intravenous, Continuous, Cindi Andrea MD     magnesium sulfate 2 g in water intermittent infusion, 2 g, Intravenous, Once, Cindi Andrea MD, Last Rate: 50 mL/hr at 22, 2 g at 22    Current Outpatient Medications:      acetaminophen (TYLENOL) 500 MG tablet, Take 1,000 mg by mouth every 6 hours, Disp: , Rfl:      amiodarone (PACERONE) 200 MG tablet, Take 1 tablet (200 mg) by mouth 3 times daily (with meals) for 6 days, Disp: 18 tablet, Rfl: 0     amiodarone (PACERONE) 200 MG tablet, Take 1 tablet (200 mg) by mouth 2 times daily, Disp: 60 tablet, Rfl: 0     clobetasol (TEMOVATE) 0.05 % cream, [CLOBETASOL (TEMOVATE) 0.05 % CREAM] Apply 1 application topically 2 (two) times a day as needed (eczema). , Disp: , Rfl:      gabapentin (NEURONTIN) 100 MG capsule, Take 400 mg by mouth 2 times daily AM and afternoon, Disp: , Rfl:      gabapentin (NEURONTIN) 300 MG capsule, Take 600 mg by mouth At Bedtime, Disp: , Rfl:      HEMP OIL OR EXTRACT OR OTHER CBD CANNABINOID, NOT MEDICAL CANNABIS,, Take 1 capsule by mouth At Bedtime, Disp: , Rfl:      hydrocortisone 1 % cream, [HYDROCORTISONE 1 %  CREAM] Apply 1 application topically 2 (two) times a day as needed (itching).       , Disp: , Rfl:      levonorgestrel (MIRENA) 20 mcg/24 hr (5 years) IUD, [LEVONORGESTREL (MIRENA) 20 MCG/24 HR (5 YEARS) IUD] 1 Device by Intrauterine route., Disp: , Rfl:      Magnesium Oxide 250 MG TABS, Take 500 mg by mouth At Bedtime, Disp: , Rfl:      Melatonin 10 MG CAPS, Take 10 mg by mouth At Bedtime, Disp: , Rfl:      Melatonin 5 MG CHEW, Take 5 mg by mouth At Bedtime , Disp: , Rfl:      metFORMIN (GLUCOPHAGE-XR) 500 MG 24 hr tablet, [METFORMIN (GLUCOPHAGE-XR) 500 MG 24 HR TABLET] Take 1,000 mg by mouth every evening., Disp: , Rfl:      metoprolol tartrate (LOPRESSOR) 50 MG tablet, Take 1 tablet (50 mg) by mouth 2 times daily, Disp: 60 tablet, Rfl: 0     ondansetron (ZOFRAN) 4 MG tablet, Take 8 mg by mouth every 8 hours as needed for nausea, Disp: , Rfl:      tiZANidine (ZANAFLEX) 4 MG tablet, Take 4 mg by mouth every 8 hours as needed for muscle spasms, Disp: , Rfl:      warfarin (COUMADIN/JANTOVEN) 5 MG tablet, Take 5 mg by mouth daily, Disp: , Rfl:     ALLERGIES:  Allergies   Allergen Reactions     Apixaban Other (See Comments)     Other reaction(s): menorrhagia  Menorrhagia       Morphine (Pf) [Morphine] Other (See Comments)     Heart rate slows and resp slow     Prednisone Palpitations       FAMILY HISTORY:  Family History   Problem Relation Age of Onset     Diabetes Father      CABG Father 50.00     Atrial fibrillation Father      Sleep Apnea Father      Skin Cancer Father      Diabetes Maternal Aunt      No Known Problems Mother      No Known Problems Son        SOCIAL HISTORY:   Social History     Socioeconomic History     Marital status:      Number of children: 2   Tobacco Use     Smoking status: Former Smoker     Types: Cigarettes, Cigarettes     Smokeless tobacco: Never Used   Substance and Sexual Activity     Alcohol use: No     Drug use: No     Sexual activity: Yes     Partners: Male     Birth  "control/protection: Implant       PHYSICAL EXAM:    Vitals: BP (!) 163/99   Pulse (!) 140   Temp 98  F (36.7  C) (Oral)   Resp 25   Ht 1.727 m (5' 8\")   Wt 136 kg (299 lb 13.2 oz)   SpO2 96%   BMI 45.59 kg/m     General:. Alert and interactive. Mildly anxious.   HENT: Oropharynx without erythema or exudates. MMM.  TMs clear bilaterally.  Eyes: Pupils mid-sized and equally reactive.   Neck: Full AROM.  No midline tenderness to palpation.  Cardiovascular: Peripheral pulses 2+ bilaterally. Tachycardia with irregularly irregular rhythm.   Chest/Pulmonary: Normal work of breathing. Lung sounds clear and equal throughout, no wheezes or crackles. No chest wall tenderness or deformities.  Abdomen: Soft, nondistended. Nontender without guarding or rebound.  Back/Spine: No CVA or midline tenderness.  Extremities: Normal ROM of all major joints. No lower extremity edema.   Skin: Warm and dry. Normal skin color.   Neuro: Speech clear. CNs grossly intact. Moves all extremities appropriately. Strength and sensation grossly intact to all extremities.   Psych: Normal affect/mood, cooperative, memory appropriate.      LAB:  All pertinent labs reviewed and interpreted.  Labs Ordered and Resulted from Time of ED Arrival to Time of ED Departure   BASIC METABOLIC PANEL - Abnormal       Result Value    Sodium 141      Potassium 3.8      Chloride 106      Carbon Dioxide (CO2) 25      Anion Gap 10      Urea Nitrogen 10      Creatinine 0.76      Calcium 10.2      Glucose 154 (*)     GFR Estimate >90     MAGNESIUM - Abnormal    Magnesium 1.6 (*)    INR - Abnormal    INR 2.96 (*)    B-TYPE NATRIURETIC PEPTIDE ([facility] ONLY) - Abnormal     (*)    CBC WITH PLATELETS AND DIFFERENTIAL - Abnormal    WBC Count 7.2      RBC Count 4.43      Hemoglobin 11.7      Hematocrit 35.9      MCV 81      MCH 26.4 (*)     MCHC 32.6      RDW 14.6      Platelet Count 240      % Neutrophils 55      % Lymphocytes 37      % Monocytes 6      % " Eosinophils 2      % Basophils 0      % Immature Granulocytes 0      NRBCs per 100 WBC 0      Absolute Neutrophils 3.9      Absolute Lymphocytes 2.6      Absolute Monocytes 0.4      Absolute Eosinophils 0.2      Absolute Basophils 0.0      Absolute Immature Granulocytes 0.0      Absolute NRBCs 0.0     TSH WITH FREE T4 REFLEX - Normal    TSH 4.10     COVID-19 VIRUS (CORONAVIRUS) BY PCR       RADIOLOGY:  No orders to display       EKG:  See MDM  Performed at: 21:28:14  Impression: Atrial fibrillation with rapid ventricular response at a rate of 141. ST depressions noted in inferior lateral leads without any ST depressions in anterior leads. Normal axis.   Rate: 141  Rhythm: Atrial fibrillation with RVR  QRS Interval: 82  QTc Interval: 499  Comparison: When compared with ECG of 07-JUN-2022 20:13, SVT noted at that time with rates at 199.   I have independently reviewed and interpreted the EKG(s) documented above.       PROCEDURES:   60 minutes critical care      I, Maya Archuleta, am serving as a scribe to document services personally performed by Dr. Renu Andrea  based on my observation and the provider's statements to me. I, Renu Andrea MD attest that Maya Archuleta is acting in a scribe capacity, has observed my performance of the services and has documented them in accordance with my direction.      Renu Andrea M.D.  Emergency Medicine  [facility] EMERGENCY ROOM  [address] [id number] [phone]  Dept: [phone]     Cindi Andrea MD  07/04/22 9033

## 2022-07-05 NOTE — CONSULTS
[facility]   [phone]        Assessment/Recommendations   Patient with recurrent atrial fibrillation and also has SVT.  She went into SVT at home, and resolved with adenosine first to sinus briefly and then into A. fib in the emergency department on recent admission..  She was admitted with atrial fibrillation and eventually cardioverted to sinus rhythm.  She had tachycardia yesterday and came back into the emergency room and was found to be in atrial fibrillation with rapid ventricular response.    We will restart her amiodarone at 200 mg twice daily.  If we can get her in for a cardioversion today we will get that done and hopefully could discharge her to the care of her electrophysiologist at [facility].  She is agreeable to cardioversions in the past, specifically [facility].  I think would be better to get her out of atrial fibrillation as she is planning on a SVT ablation and may need recurrent PVI in the future as well.  Recent INR's are reviewed and are all above 2 with the exception of 2 months ago.  In the last 4 weeks she has had an INR of 2.21, 2.24, 2.51, and 2.96.     History of Present Illness/Subjective    Ms. Milagro Thomas is a 46 year old female with known history of atrial fibrillation has had 2 separate pulmonary vein isolation procedures both at [facility].  She also has SVT and is scheduled for SVT ablation tentatively in August.  She will meet the electrophysiologist edema prior to the procedure.  She was hospitalized here at [facility] with atrial fibrillation in June of this year and was seen by Dr. Jefferson in our group.  She underwent electrical cardioversion with restoration of sinus rhythm and was discharged on amiodarone 200 mg 3 times a day and this was just decreased to 200 mg twice daily which she was on up until her admission.  Yesterday she was enjoying a Fourth of July holiday with her family and noted that she flipped into tachycardia.   "She can tell demonstrating SVT and atrial fibrillation.    She has been feeling reasonably well of late and down to 200 mg twice daily amiodarone.  She has been in contact with Dr. Rodriguez who is her electrophysiologist at [facility].    ECG: Personally reviewed.  Atrial fibrillation with rapid ventricular response.       Physical Examination Review of Systems   /88 (BP Location: Left arm)   Pulse 90   Temp 98.3  F (36.8  C) (Oral)   Resp 20   Ht 1.727 m (5' 8\")   Wt 138.5 kg (305 lb 6.4 oz)   SpO2 97%   BMI 46.44 kg/m    Body mass index is 46.44 kg/m .  Wt Readings from Last 3 Encounters:   07/05/22 138.5 kg (305 lb 6.4 oz)   06/09/22 136 kg (299 lb 12.8 oz)   04/23/22 137.9 kg (304 lb)     General Appearance:   Alert, cooperative and in no acute distress.   ENT/Mouth: Patient wearing a mask.      EYES:  no scleral icterus, normal conjunctivae   Neck: JVP normal. No Hepatojugular reflux. Thyroid not visualized.   Chest/Lungs:   Lungs are clear to auscultation, equal chest wall expansion.   Cardiovascular:   S1, S2 without murmur ,clicks or rubs. Brachial, radial and posterior tibial pulses are intact and symetric. No carotid bruits noted   Abdomen:     Extremities: No cyanosis, clubbing or edema   Skin: no xanthelasma, warm.    Neurologic: normal arm movement bilateral, no tremors     Psychiatric: Appropriate affect.      Enc Vitals  BP: 136/88  Pulse: 90  Resp: 20  Temp: 98.3  F (36.8  C)  Temp src: Oral  SpO2: 97 %  Weight: 138.5 kg (305 lb 6.4 oz)  Height: 172.7 cm (5' 8\")                                           Medical History  Surgical History Family History Social History   Past Medical History:   Diagnosis Date     Essential hypertension      Morbid obesity (H)      CALLY (obstructive sleep apnea)      Paroxysmal atrial fibrillation (H)     s/p ablation x2     TBI (traumatic brain injury) (H)      Type 2 diabetes mellitus without complication, without long-term current use of insulin (H)     " Past Surgical History:   Procedure Laterality Date      SECTION      x2     CHOLECYSTECTOMY       FOOT SURGERY      bone spur and reconstruction    Family History   Problem Relation Age of Onset     Diabetes Father      CABG Father 50.00     Atrial fibrillation Father      Sleep Apnea Father      Skin Cancer Father      Diabetes Maternal Aunt      No Known Problems Mother      No Known Problems Son     Social History     Socioeconomic History     Marital status:      Spouse name: Not on file     Number of children: 2     Years of education: Not on file     Highest education level: Not on file   Occupational History     Not on file   Tobacco Use     Smoking status: Former Smoker     Types: Cigarettes, Cigarettes     Smokeless tobacco: Never Used   Substance and Sexual Activity     Alcohol use: No     Drug use: No     Sexual activity: Yes     Partners: Male     Birth control/protection: Implant   Other Topics Concern     Not on file   Social History Narrative     Not on file     Social Determinants of Health     Financial Resource Strain: Not on file   Food Insecurity: Not on file   Transportation Needs: Not on file   Physical Activity: Not on file   Stress: Not on file   Social Connections: Not on file   Intimate Partner Violence: Not on file   Housing Stability: Not on file          Medications  Allergies   No current outpatient medications on file.    Allergies   Allergen Reactions     Apixaban Other (See Comments)     Other reaction(s): menorrhagia  Menorrhagia       Prednisone Palpitations         Lab Results    Chemistry/lipid CBC Cardiac Enzymes/BNP/TSH/INR   Lab Results   Component Value Date    CHOL 134 2017    HDL 32 (L) 2017    TRIG 114 2017    BUN 8 2022     2022    CO2 25 2022    Lab Results   Component Value Date    WBC 6.5 2022    HGB 11.9 2022    HCT 37.0 2022    MCV 82 2022     2022    Lab Results   Component  Value Date    TROPONINI <0.01 07/04/2022     (H) 07/04/2022    TSH 4.10 07/04/2022    INR 2.96 (H) 07/04/2022

## 2022-07-05 NOTE — ANESTHESIA POSTPROCEDURE EVALUATION
Patient: Milagro Thomas    Procedure: * No procedures listed *       Anesthesia Type:  General    Note:     Postop Pain Control: Uneventful            Sign Out: Well controlled pain   PONV: No   Neuro/Psych: Uneventful            Sign Out: Acceptable/Baseline neuro status   Airway/Respiratory: Uneventful            Sign Out: Acceptable/Baseline resp. status   CV/Hemodynamics: Uneventful            Sign Out: Acceptable CV status; No obvious hypovolemia; No obvious fluid overload   Other NRE: NONE   DID A NON-ROUTINE EVENT OCCUR? No           Last vitals:  Vitals:    07/05/22 1312 07/05/22 1315 07/05/22 1330   BP: 124/80 115/75 126/79   Pulse: 62 59 61   Resp:      Temp:      SpO2: 96% 97%        Electronically Signed By: Anatoly Almazan MD  July 5, 2022  2:13 PM
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
## 2022-07-05 NOTE — PHARMACY-ADMISSION MEDICATION HISTORY
Pharmacy Note - Admission Medication History    Pertinent Provider Information: pt was prescribed metoprolol 50 mg BID after last hospitalization.  Dose was decreased to 25 mg BID on follow up.  Patient has cut dose back further to 12.5 mg BID and only takes when HR >70 (often HR too low to take)     ______________________________________________________________________    Prior To Admission (PTA) med list completed and updated in EMR.       PTA Med List   Medication Sig Note Last Dose     acetaminophen (TYLENOL) 500 MG tablet Take 1,000 mg by mouth every 6 hours  7/5/2022 at Unknown time     amiodarone (PACERONE) 200 MG tablet Take 1 tablet (200 mg) by mouth 2 times daily  7/4/2022 at Unknown time     clobetasol (TEMOVATE) 0.05 % cream [CLOBETASOL (TEMOVATE) 0.05 % CREAM] Apply 1 application topically 2 (two) times a day as needed (eczema).   PRN     gabapentin (NEURONTIN) 100 MG capsule Take 400 mg by mouth 2 times daily AM and afternoon  7/4/2022 at Unknown time     gabapentin (NEURONTIN) 300 MG capsule Take 600 mg by mouth At Bedtime  7/3/2022 at Unknown time     HEMP OIL OR EXTRACT OR OTHER CBD CANNABINOID, NOT MEDICAL CANNABIS, Take 1 capsule by mouth At Bedtime  Past Week at Unknown time     hydrocortisone 1 % cream [HYDROCORTISONE 1 % CREAM] Apply 1 application topically 2 (two) times a day as needed (itching).         PRN     levonorgestrel (MIRENA) 20 mcg/24 hr (5 years) IUD [LEVONORGESTREL (MIRENA) 20 MCG/24 HR (5 YEARS) IUD] 1 Device by Intrauterine route.  in place     Magnesium Oxide 250 MG TABS Take 500 mg by mouth At Bedtime  7/4/2022 at Unknown time     Melatonin 10 MG CAPS Take 10 mg by mouth At Bedtime  Past Week at Unknown time     Melatonin 5 MG CHEW Take 5 mg by mouth At Bedtime   Past Week at Unknown time     metFORMIN (GLUCOPHAGE-XR) 500 MG 24 hr tablet [METFORMIN (GLUCOPHAGE-XR) 500 MG 24 HR TABLET] Take 1,000 mg by mouth every evening.  7/4/2022 at Unknown time     metoprolol tartrate  (LOPRESSOR) 50 MG tablet Take 1 tablet (50 mg) by mouth 2 times daily (Patient taking differently: Take 12.5 mg by mouth 2 times daily) 7/5/2022: Pt will take pulse prior to taking-- will only take if HR >70.  Often times HR is too low to take per pt report Past Week at Unknown time     ondansetron (ZOFRAN) 4 MG tablet Take 8 mg by mouth every 8 hours as needed for nausea  Past Month at Unknown time     tiZANidine (ZANAFLEX) 4 MG tablet Take 4 mg by mouth every 8 hours as needed for muscle spasms  Past Week at Unknown time     warfarin (COUMADIN/JANTOVEN) 5 MG tablet Take 5 mg by mouth daily  7/3/2022 at had dose for 7/4 early 7/5 inpatient       Information source(s): Patient, Clinic records and Hospital records  Method of interview communication: in-person    Summary of Changes to PTA Med List  New: none  Discontinued: none  Changed: metoprolol dosing    Patient was asked about OTC/herbal products specifically.  PTA med list reflects this.    In the past week, patient estimated taking medication this percent of the time:  greater than 90%.    Allergies were reviewed, assessed, and updated with the patient.      Patient did not bring any medications to the hospital and can't retrieve from home. No multi-dose medications are available for use during hospital stay.     The information provided in this note is only as accurate as the sources available at the time of the update(s).    Thank you for the opportunity to participate in the care of this patient.    Ivis Joel [facility]  7/5/2022 9:14 AM

## 2022-07-05 NOTE — H&P
[facility] MEDICINE ADMISSION HISTORY AND PHYSICAL       Assessment & Plan      1. Poorly controlled Afib - On cardizem drip at 10 mgs/hr -- rate relatively controlled at this point. The visual ejection fraction is 45-50%.     Unclear to me why the exacerbation - no GI loss, no fevers or infections.     She has low Mag of 1.6. Her QTc is almost 500     - cardiology evaluation  - AM labs  - replace electrolytes  - she has an upcoming appointment at [facility] for ablation     2. DM2 -- hold metformin risk of acidosis  - sliding scale insulin/FS    3. CALLY on CPAP    4. On coumadin for Afib  - pharm for coumadin care    5. Hold Tizanidine given QTc issues         VTE prophylaxis: on coumadin    Diet:  cardaic   Code Status: Full,   COVID test result:  negative   COVID vaccination: completed   Barriers to discharge: admitting clinical condition  Discharge Disposition and goals:  Unable to determine at this point, pending clinical progress and response to treatment. Patient may need transfer to SNF or [facility] if unsafe to go home and needed treatment inappropriate at home setting OR may need home health care evaluation if care can be delivered at home settings. Consider referral to care manager/    PPE - I was wearing PPE when I met the patient - N95 mask, Surgical mask, Isolation gown, Gloves, Safety glasses.      Care plan was created based on available information provided, including patient's condition at the time of encounter.   This plan was discussed with patient and/or family members using layman's terms and have agreed to proceed.   At the end of night shift (9PM - 730A), this case will be presented to the AM Hospitalist.    It is recommended to revise care plan and review history if there is change in condition and/or new clinical information is not available during my encounter.     All or some of home medication/s were not resumed on admission due to safety reasons or  contraindications. Dosing and frequency may also have been modified. Please resume/review them during your visit.     70 minutes of total visit duration and greater than 50% was spent in direct evaluation of patient and coordination of care including discussion of diagnostic test results and recommended treatment. .      Jaylen Rocha MD, MPH, FACP, [facility]  Internal Medicine - Hospitalist        Chief Complaint Palpitation      HISTORY     -  She was awake and alert when I met her. She came in because of palpitations. She was just about to watch the [employer]. She is known to have afib. She was SOB that time she was having palpitations. She has no fever. She took 240 mgs of cardizem. Its not better. She decided to come in. No chest pain.     - In the ED, she was given IV metoprolol and eventually had cardizem drip --- currently at 10 mgs/hr.   - BNP mildly elevated. EKG showed Afib. QTc 500.       - ROS --- No headache. No dizziness. No weakness. No abdominal pain. No nausea or vomiting. No urinary symptoms. No bleeding symptoms. No weight loss. Rest of 12 point ROS was reviewed and negative.       Past Medical History     Past Medical History:   Diagnosis Date     Essential hypertension      Morbid obesity (H)      CALLY (obstructive sleep apnea)      Paroxysmal atrial fibrillation (H)     s/p ablation x2     TBI (traumatic brain injury) (H)      Type 2 diabetes mellitus without complication, without long-term current use of insulin (H)          Surgical History     Past Surgical History:   Procedure Laterality Date      SECTION      x2     CHOLECYSTECTOMY       FOOT SURGERY      bone spur and reconstruction        Family History      Family History   Problem Relation Age of Onset     Diabetes Father      CABG Father 50.00     Atrial fibrillation Father      Sleep Apnea Father      Skin Cancer Father      Diabetes Maternal Aunt      No Known Problems Mother      No Known Problems Son          Social History       .  Social History     Socioeconomic History     Marital status:      Spouse name: Not on file     Number of children: 2     Years of education: Not on file     Highest education level: Not on file   Occupational History     Not on file   Tobacco Use     Smoking status: Former Smoker     Types: Cigarettes, Cigarettes     Smokeless tobacco: Never Used   Substance and Sexual Activity     Alcohol use: No     Drug use: No     Sexual activity: Yes     Partners: Male     Birth control/protection: Implant   Other Topics Concern     Not on file   Social History Narrative     Not on file     Social Determinants of Health     Financial Resource Strain: Not on file   Food Insecurity: Not on file   Transportation Needs: Not on file   Physical Activity: Not on file   Stress: Not on file   Social Connections: Not on file   Intimate Partner Violence: Not on file   Housing Stability: Not on file          Allergies        Allergies   Allergen Reactions     Apixaban Other (See Comments)     Other reaction(s): menorrhagia  Menorrhagia       Morphine (Pf) [Morphine] Other (See Comments)     Heart rate slows and resp slow     Prednisone Palpitations         Prior to Admission Medications      No current facility-administered medications on file prior to encounter.  acetaminophen (TYLENOL) 500 MG tablet, Take 1,000 mg by mouth every 6 hours  amiodarone (PACERONE) 200 MG tablet, Take 1 tablet (200 mg) by mouth 3 times daily (with meals) for 6 days  amiodarone (PACERONE) 200 MG tablet, Take 1 tablet (200 mg) by mouth 2 times daily  clobetasol (TEMOVATE) 0.05 % cream, [CLOBETASOL (TEMOVATE) 0.05 % CREAM] Apply 1 application topically 2 (two) times a day as needed (eczema).   gabapentin (NEURONTIN) 100 MG capsule, Take 400 mg by mouth 2 times daily AM and afternoon  gabapentin (NEURONTIN) 300 MG capsule, Take 600 mg by mouth At Bedtime  HEMP OIL OR EXTRACT OR OTHER CBD CANNABINOID, NOT MEDICAL CANNABIS,, Take 1 capsule by mouth At  "Bedtime  hydrocortisone 1 % cream, [HYDROCORTISONE 1 % CREAM] Apply 1 application topically 2 (two) times a day as needed (itching).         levonorgestrel (MIRENA) 20 mcg/24 hr (5 years) IUD, [LEVONORGESTREL (MIRENA) 20 MCG/24 HR (5 YEARS) IUD] 1 Device by Intrauterine route.  Magnesium Oxide 250 MG TABS, Take 500 mg by mouth At Bedtime  Melatonin 10 MG CAPS, Take 10 mg by mouth At Bedtime  Melatonin 5 MG CHEW, Take 5 mg by mouth At Bedtime   metFORMIN (GLUCOPHAGE-XR) 500 MG 24 hr tablet, [METFORMIN (GLUCOPHAGE-XR) 500 MG 24 HR TABLET] Take 1,000 mg by mouth every evening.  metoprolol tartrate (LOPRESSOR) 50 MG tablet, Take 1 tablet (50 mg) by mouth 2 times daily  ondansetron (ZOFRAN) 4 MG tablet, Take 8 mg by mouth every 8 hours as needed for nausea  tiZANidine (ZANAFLEX) 4 MG tablet, Take 4 mg by mouth every 8 hours as needed for muscle spasms  warfarin (COUMADIN/JANTOVEN) 5 MG tablet, Take 5 mg by mouth daily  [DISCONTINUED] diltiazem (CARDIZEM) 60 MG tablet, [DILTIAZEM (CARDIZEM) 60 MG TABLET] Take 1 tablet (60 mg total) by mouth every 6 (six) hours as needed (rapid heartbeat).  [DISCONTINUED] hydrALAZINE (APRESOLINE) 25 MG tablet, Take 50 mg by mouth 2 times daily  [DISCONTINUED] metoprolol succinate ER (TOPROL XL) 25 MG 24 hr tablet, Take 12.5 mg by mouth daily            Review of Systems     A 12 point comprehensive review of systems was negative except as noted above in HPI.    PHYSICAL EXAMINATION       Vitals      Vitals: BP (!) 163/99   Pulse (!) 140   Temp 98  F (36.7  C) (Oral)   Resp 25   Ht 1.727 m (5' 8\")   Wt 136 kg (299 lb 13.2 oz)   SpO2 96%   BMI 45.59 kg/m    BMI= Body mass index is 45.59 kg/m .      Examination     General Appearance:  Alert, cooperative, no distress  Head:    Normocephalic, without obvious abnormality, atraumatic  EENT:  PERRL, conjunctiva/corneas clear, EOM's intact.   Neck:   Supple, symmetrical, trachea midline, no adenopathy; no NVE  Back:  Symmetric, no " curvature, no CVA tenderness  Chest/Lungs: Clear to auscultation bilaterally, respirations unlabored, No tenderness or deformity. No abdominal breathing or use of accessory muscles.   Heart:    Regular rate and rhythm, S1 and S2 normal, no murmur, rub   or gallop  Abdomen: Soft, non-tender, bowel sounds active all four quadrants, not peritoneal on palpation. Not distended  Extremities:  Extremities normal, atraumatic, no swelling   Skin:  Skin color, texture, turgor normal, no rashes or lesion  Neurologic:  Awake and alert, No lateralizing or localizing signs           Pertinent Lab     Results for orders placed or performed during the hospital encounter of 07/04/22   Basic metabolic panel   Result Value Ref Range    Sodium 141 136 - 145 mmol/L    Potassium 3.8 3.5 - 5.0 mmol/L    Chloride 106 98 - 107 mmol/L    Carbon Dioxide (CO2) 25 22 - 31 mmol/L    Anion Gap 10 5 - 18 mmol/L    Urea Nitrogen 10 8 - 22 mg/dL    Creatinine 0.76 0.60 - 1.10 mg/dL    Calcium 10.2 8.5 - 10.5 mg/dL    Glucose 154 (H) 70 - 125 mg/dL    GFR Estimate >90 >60 mL/min/1.73m2   Result Value Ref Range    Magnesium 1.6 (L) 1.8 - 2.6 mg/dL   TSH with free T4 reflex   Result Value Ref Range    TSH 4.10 0.30 - 5.00 uIU/mL   Result Value Ref Range    INR 2.96 (H) 0.85 - 1.15   B-Type Natriuretic Peptide ([facility] Only)   Result Value Ref Range     (H) 0 - 64 pg/mL   CBC with platelets and differential   Result Value Ref Range    WBC Count 7.2 4.0 - 11.0 10e3/uL    RBC Count 4.43 3.80 - 5.20 10e6/uL    Hemoglobin 11.7 11.7 - 15.7 g/dL    Hematocrit 35.9 35.0 - 47.0 %    MCV 81 78 - 100 fL    MCH 26.4 (L) 26.5 - 33.0 pg    MCHC 32.6 31.5 - 36.5 g/dL    RDW 14.6 10.0 - 15.0 %    Platelet Count 240 150 - 450 10e3/uL    % Neutrophils 55 %    % Lymphocytes 37 %    % Monocytes 6 %    % Eosinophils 2 %    % Basophils 0 %    % Immature Granulocytes 0 %    NRBCs per 100 WBC 0 <1 /100    Absolute Neutrophils 3.9 1.6 - 8.3 10e3/uL    Absolute  Lymphocytes 2.6 0.8 - 5.3 10e3/uL    Absolute Monocytes 0.4 0.0 - 1.3 10e3/uL    Absolute Eosinophils 0.2 0.0 - 0.7 10e3/uL    Absolute Basophils 0.0 0.0 - 0.2 10e3/uL    Absolute Immature Granulocytes 0.0 <=0.4 10e3/uL    Absolute NRBCs 0.0 10e3/uL           Pertinent Radiology

## 2022-07-06 ENCOUNTER — PATIENT OUTREACH (OUTPATIENT)
Dept: CARE COORDINATION | Facility: CLINIC | Age: 47
End: 2022-07-06

## 2022-07-06 DIAGNOSIS — Z71.89 OTHER SPECIFIED COUNSELING: ICD-10-CM

## 2022-07-06 NOTE — PROGRESS NOTES
"Clinic Care Coordination Contact  [facility]: Post-Discharge Note  SITUATION                                                      Admission:    Admission Date: 07/04/22   Reason for Admission: Palpitation  Discharge:   Discharge Date: 07/05/22  Discharge Diagnosis: Palpitation    BACKGROUND                                                      Per hospital discharge summary and inpatient provider notes:   She was awake and alert when I met her. She came in because of palpitations. She was just about to watch the fireworks. She is known to have afib. She was SOB that time she was having palpitations. She has no fever. She took 240 mgs of cardizem. Its not better. She decided to come in. No chest pain.      - In the ED, she was given IV metoprolol and eventually had cardizem drip --- currently at 10 mgs/hr.   - BNP mildly elevated. EKG showed Afib. QTc 500.         - ROS --- No headache. No dizziness. No weakness. No abdominal pain. No nausea or vomiting. No urinary symptoms. No bleeding symptoms. No weight loss. Rest of 12 point ROS was reviewed and negative.           ASSESSMENT      Enrollment  Primary Care Care Coordination Status: Not a Candidate    Discharge Assessment  How are you doing now that you are home?: \" Tired very Tired\"  How are your symptoms? (Red Flag symptoms escalate to triage hotline per guidelines): Unchanged  Do you feel your condition is stable enough to be safe at home until your provider visit?: Yes  Does the patient have their discharge instructions? : Yes  Does the patient have questions regarding their discharge instructions? : No  Were you started on any new medications or were there changes to any of your previous medications? : Yes  Does the patient have all of their medications?: Yes  Do you have questions regarding any of your medications? : Yes (see comment) (\" I have a call in already\")  Do you have all of your needed medical supplies or equipment (DME)?  (i.e. oxygen tank, " CPAP, cane, etc.): Yes  Discharge follow-up appointment scheduled within 14 calendar days? : Yes  Discharge Follow Up Appointment Date: 07/07/22    Post-op ([facility] Only)  If the patient had a surgery or procedure, do they have any questions for a nurse?: No             PLAN                                                      Outpatient Plan:    these medications from any pharmacy  acetaminophen  Your activity upon discharge: activity as tolerated and no driving for today  Follow this diet upon discharge: Combination Diet 2 gm NA Diet; Low Saturated Fat Na <2400mg Diet  Follow-up and recommended labs and tests  F/up with electrophysiologist at [facility] in 1-2 wks for hospital f/up  Your INR is within goal range - f/up as scheduled with your next outpt INR  No future appointments.      For any urgent concerns, please contact our 24 hour nurse triage line: [phone] ([phone])         Sarah De La Rosa MA

## 2022-08-26 ENCOUNTER — HOSPITAL ENCOUNTER (EMERGENCY)
Facility: CLINIC | Age: 47
Discharge: HOME | End: 2022-08-26
Attending: EMERGENCY MEDICINE | Admitting: EMERGENCY MEDICINE
Payer: COMMERCIAL

## 2022-08-26 ENCOUNTER — APPOINTMENT (OUTPATIENT)
Dept: RADIOLOGY | Facility: CLINIC | Age: 47
End: 2022-08-26
Attending: EMERGENCY MEDICINE
Payer: COMMERCIAL

## 2022-08-26 VITALS
TEMPERATURE: 98.4 F | BODY MASS INDEX: 44.41 KG/M2 | HEIGHT: 68 IN | RESPIRATION RATE: 15 BRPM | DIASTOLIC BLOOD PRESSURE: 81 MMHG | OXYGEN SATURATION: 97 % | WEIGHT: 293 LBS | SYSTOLIC BLOOD PRESSURE: 130 MMHG | HEART RATE: 121 BPM

## 2022-08-26 DIAGNOSIS — I47.10 SVT (SUPRAVENTRICULAR TACHYCARDIA) (H): ICD-10-CM

## 2022-08-26 DIAGNOSIS — I48.92 ATRIAL FLUTTER, UNSPECIFIED TYPE (H): ICD-10-CM

## 2022-08-26 LAB
ALBUMIN SERPL-MCNC: 4 G/DL (ref 3.5–5)
ALP SERPL-CCNC: 87 U/L (ref 45–120)
ALT SERPL W P-5'-P-CCNC: 19 U/L (ref 0–45)
ANION GAP SERPL CALCULATED.3IONS-SCNC: 11 MMOL/L (ref 5–18)
AST SERPL W P-5'-P-CCNC: 26 U/L (ref 0–40)
ATRIAL RATE - MUSE: 278 BPM
BILIRUB SERPL-MCNC: 0.7 MG/DL (ref 0–1)
BNP SERPL-MCNC: 272 PG/ML (ref 0–64)
BUN SERPL-MCNC: 11 MG/DL (ref 8–22)
CALCIUM SERPL-MCNC: 9.6 MG/DL (ref 8.5–10.5)
CHLORIDE BLD-SCNC: 103 MMOL/L (ref 98–107)
CO2 SERPL-SCNC: 25 MMOL/L (ref 22–31)
CREAT SERPL-MCNC: 0.81 MG/DL (ref 0.6–1.1)
DIASTOLIC BLOOD PRESSURE - MUSE: NORMAL MMHG
ERYTHROCYTE [DISTWIDTH] IN BLOOD BY AUTOMATED COUNT: 14.6 % (ref 10–15)
GFR SERPL CREATININE-BSD FRML MDRD: >90 ML/MIN/1.73M2
GLUCOSE BLD-MCNC: 112 MG/DL (ref 70–125)
HCT VFR BLD AUTO: 38.5 % (ref 35–47)
HGB BLD-MCNC: 12.3 G/DL (ref 11.7–15.7)
HOLD SPECIMEN: NORMAL
INR PPP: 2.94 (ref 0.85–1.15)
INTERPRETATION ECG - MUSE: NORMAL
MAGNESIUM SERPL-MCNC: 1.9 MG/DL (ref 1.8–2.6)
MCH RBC QN AUTO: 26.7 PG (ref 26.5–33)
MCHC RBC AUTO-ENTMCNC: 31.9 G/DL (ref 31.5–36.5)
MCV RBC AUTO: 84 FL (ref 78–100)
P AXIS - MUSE: NORMAL DEGREES
PLATELET # BLD AUTO: 274 10E3/UL (ref 150–450)
POTASSIUM BLD-SCNC: 4.2 MMOL/L (ref 3.5–5)
PR INTERVAL - MUSE: NORMAL MS
PROT SERPL-MCNC: 7.8 G/DL (ref 6–8)
QRS DURATION - MUSE: 84 MS
QT - MUSE: 348 MS
QTC - MUSE: 477 MS
R AXIS - MUSE: 31 DEGREES
RBC # BLD AUTO: 4.61 10E6/UL (ref 3.8–5.2)
SODIUM SERPL-SCNC: 139 MMOL/L (ref 136–145)
SYSTOLIC BLOOD PRESSURE - MUSE: NORMAL MMHG
T AXIS - MUSE: 3 DEGREES
TROPONIN I SERPL-MCNC: 0.02 NG/ML (ref 0–0.29)
VENTRICULAR RATE- MUSE: 113 BPM
WBC # BLD AUTO: 6.7 10E3/UL (ref 4–11)

## 2022-08-26 PROCEDURE — 84484 ASSAY OF TROPONIN QUANT: CPT | Performed by: EMERGENCY MEDICINE

## 2022-08-26 PROCEDURE — 93005 ELECTROCARDIOGRAM TRACING: CPT | Performed by: EMERGENCY MEDICINE

## 2022-08-26 PROCEDURE — 80053 COMPREHEN METABOLIC PANEL: CPT | Performed by: EMERGENCY MEDICINE

## 2022-08-26 PROCEDURE — 71045 X-RAY EXAM CHEST 1 VIEW: CPT

## 2022-08-26 PROCEDURE — 99285 EMERGENCY DEPT VISIT HI MDM: CPT | Mod: 25

## 2022-08-26 PROCEDURE — 82040 ASSAY OF SERUM ALBUMIN: CPT | Performed by: EMERGENCY MEDICINE

## 2022-08-26 PROCEDURE — 36415 COLL VENOUS BLD VENIPUNCTURE: CPT | Performed by: EMERGENCY MEDICINE

## 2022-08-26 PROCEDURE — 83735 ASSAY OF MAGNESIUM: CPT | Performed by: EMERGENCY MEDICINE

## 2022-08-26 PROCEDURE — 250N000009 HC RX 250: Performed by: EMERGENCY MEDICINE

## 2022-08-26 PROCEDURE — 83880 ASSAY OF NATRIURETIC PEPTIDE: CPT | Performed by: EMERGENCY MEDICINE

## 2022-08-26 PROCEDURE — 96374 THER/PROPH/DIAG INJ IV PUSH: CPT

## 2022-08-26 PROCEDURE — 85610 PROTHROMBIN TIME: CPT | Performed by: EMERGENCY MEDICINE

## 2022-08-26 PROCEDURE — 85014 HEMATOCRIT: CPT | Performed by: EMERGENCY MEDICINE

## 2022-08-26 RX ORDER — METOPROLOL TARTRATE 1 MG/ML
5 INJECTION, SOLUTION INTRAVENOUS ONCE
Status: COMPLETED | OUTPATIENT
Start: 2022-08-26 | End: 2022-08-26

## 2022-08-26 RX ORDER — METOPROLOL TARTRATE 25 MG/1
50 TABLET, FILM COATED ORAL 2 TIMES DAILY
Qty: 30 TABLET | Refills: 0 | Status: SHIPPED | OUTPATIENT
Start: 2022-08-26 | End: 2022-08-26

## 2022-08-26 RX ORDER — METOPROLOL TARTRATE 25 MG/1
25 TABLET, FILM COATED ORAL 2 TIMES DAILY
Qty: 30 TABLET | Refills: 0 | Status: SHIPPED | OUTPATIENT
Start: 2022-08-26

## 2022-08-26 RX ADMIN — METOPROLOL TARTRATE 5 MG: 5 INJECTION INTRAVENOUS at 15:59

## 2022-08-26 RX ADMIN — METOPROLOL TARTRATE 5 MG: 5 INJECTION INTRAVENOUS at 15:37

## 2022-08-26 ASSESSMENT — ENCOUNTER SYMPTOMS
FREQUENCY: 1
PALPITATIONS: 1
WOUND: 0
LIGHT-HEADEDNESS: 0
FEVER: 0
SHORTNESS OF BREATH: 1
ABDOMINAL PAIN: 0
CONFUSION: 0
MYALGIAS: 0

## 2022-08-26 ASSESSMENT — ACTIVITIES OF DAILY LIVING (ADL)
ADLS_ACUITY_SCORE: 35
ADLS_ACUITY_SCORE: 35

## 2022-08-26 NOTE — ED NOTES
Pt states feels like she has been in afb for over 24 hours and called cardiology and was told to come to ER for cardioversion, pt takes coumadin last INR she states was when she was admitted here around 8/12/22. Denies chest pain, states feels short of breath with exertion, denies feeling dizzy or lightheaded.

## 2022-08-26 NOTE — DISCHARGE INSTRUCTIONS
increase metoprolol to 1 tablet twice daily.  Follow-up with your cardiologist on Monday for cardioversion.  Return to  the ER for worsening symptoms

## 2022-08-26 NOTE — ED TRIAGE NOTES
Pt who had a cardiac ablation 8/12 presents in A-fib for the past 24 hours.  Told by her cardiologist to come to the ED for Cardioversion.     Triage Assessment     Row Name 08/26/22 1148       Triage Assessment (Adult)    Airway WDL WDL       Respiratory WDL    Respiratory WDL WDL       Cardiac WDL    Cardiac WDL WDL       Peripheral/Neurovascular WDL    Peripheral Neurovascular WDL WDL       Cognitive/Neuro/Behavioral WDL    Cognitive/Neuro/Behavioral WDL WDL

## 2022-08-26 NOTE — ED PROVIDER NOTES
EMERGENCY DEPARTMENT ENCOUNTER      NAME: Milagro Thomas  AGE: 46 year old female  YOB: 1975  MRN: [id number]  EVALUATION DATE & TIME: 2022 12:46 PM    PCP: Misha Stevenson    ED PROVIDER: Cyrus Moore MD        Chief Complaint   Patient presents with     Tachycardia         FINAL IMPRESSION:    Atrial fibrillation with RVR    ED COURSE & MEDICAL DECISION MAKIN:25 PM I met with the patient, obtained history, performed an initial exam, and discussed options and plan for diagnostics and treatment here in the ED. PPE worn: n95 mask, face shield, gloves   2:32 PM I spoke to cardiology. They did not tell patient to come in and get an ablation. Paged Dr. Rodriguez, [facility] cardiology.   3:05 PM I spoke to Dr. Barnett, [facility] [place]. They suggested that the patient see them on Monday for the cardioversion. This is not emergent. Dr. Barnett informed me that this would be a safe plan.   3:07 PM I updated the patient.     Pertinent Labs & Imaging studies reviewed. (See chart for details)  46 year old female presents to the Emergency Department for evaluation of palpitations present for approximately 28 hours    Patient states her cardiologist says to come to the ER for cardioversion if she remains in A. fib more than 24 hours.  She is anticoagulated however she has risk factors for decompensation with sedation with obesity, sleep apnea, and per chart review she required 8 L of oxygen during her last cardioversion.     On exam she is well-appearing and is tolerating the A. fib with RVR well    Mildly elevated BNP not significantly worse than previous.  Pulm emboli, ACS unlikely    ED Course as of 22 1640   Fri Aug 26, 2022   1638 Troponin I: 0.02   1638 INR(!): 2.94   1638 Magnesium: 1.9   1638 Potassium: 4.2   Chest x-ray shows improved proved pulmonary edema and pleural effusion compared to previous x-ray    I spoke with patient's cardiologist at [facility] who states they can cardiovert her on  "Monday.  He do not feel emergent cardioversion is indicated tonight.  I did attempt rate control with metoprolol x2.  Discussed admission to the hospital for rate control and possible cardioversion tomorrow patient declined. Patient would rather go home and get cardioverted on Monday by her primary cardiologist    This is reasonable and safer for the patient based on her previous experiences with cardioversion if she does not want to be admitted.     Will have patient increase metoprolol to 1 tablet twice a day    At the conclusion of the encounter I discussed the results of all of the tests and the disposition. The questions were answered. The patient or family acknowledged understanding and was agreeable with the care plan.         MEDICATIONS GIVEN IN THE EMERGENCY:  Medications   metoprolol (LOPRESSOR) injection 5 mg (5 mg Intravenous Given 8/26/22 1537)   metoprolol (LOPRESSOR) injection 5 mg (5 mg Intravenous Given 8/26/22 1559)       NEW PRESCRIPTIONS STARTED AT TODAY'S ER VISIT  Current Discharge Medication List             =================================================================    HPI    Triage note  \"  Pt who had a cardiac ablation 8/12 presents in A-fib for the past 24 hours.  Told by her cardiologist to come to the ED for Cardioversion.     Triage Assessment     Row Name 08/26/22 1148       Triage Assessment (Adult)    Airway WDL WDL       Respiratory WDL    Respiratory WDL WDL       Cardiac WDL    Cardiac WDL WDL       Peripheral/Neurovascular WDL    Peripheral Neurovascular WDL WDL       Cognitive/Neuro/Behavioral WDL    Cognitive/Neuro/Behavioral WDL WDL              \"      Patient information was obtained from: Patient     Use of : N/A         Milagro Thomas is a 46 year old female with a pertinent history of atrial fibrillation, PAF, SVT, atrial tachycardia, hypertension, DM II, CALLY, and KACY who presents to this ED by walk in for evaluation of tachycardia.    Patient reports " having tachycardia since Thursday at 12:00 AM (~36 hours ago). The patient states that she recently had an s/p ablation on 22 (~2 weeks ago). She states that she was given directions to present to the ED for a cardioversion if she has been in constant atrial fibrillation for 24-72 hours, prompting her to be present in the ED. She was told to take Metoprolol when she has her atrial fibrillation episodes, so she took Metoprolol yesterday and today. She has not had anything to eat today. Her INR has been normal. She endorses shortness of breath, but no chest pain. The patient also reports of urination frequency. She otherwise denies any other symptoms or complaints at this time. Patient is taking Warfarin, Gabapentin, Magnesium, CBD, Metformin, Tizanidine, a sleeping medication, and Metoprolol PRN.       REVIEW OF SYSTEMS   Review of Systems   Constitutional: Negative for fever.   HENT: Negative for drooling.    Respiratory: Positive for shortness of breath.    Cardiovascular: Positive for palpitations. Negative for chest pain.   Gastrointestinal: Negative for abdominal pain.   Genitourinary: Positive for frequency.   Musculoskeletal: Negative for myalgias.   Skin: Negative for wound.   Allergic/Immunologic: Negative for immunocompromised state.   Neurological: Negative for light-headedness.   Psychiatric/Behavioral: Negative for confusion.       PAST MEDICAL HISTORY:  Past Medical History:   Diagnosis Date     Essential hypertension      Morbid obesity (H)      CALLY (obstructive sleep apnea)      Paroxysmal atrial fibrillation (H)     s/p ablation x2     TBI (traumatic brain injury) (H)      Type 2 diabetes mellitus without complication, without long-term current use of insulin (H)        PAST SURGICAL HISTORY:  Past Surgical History:   Procedure Laterality Date      SECTION      x2     CHOLECYSTECTOMY       FOOT SURGERY      bone spur and reconstruction           CURRENT MEDICATIONS:    metoprolol  "tartrate (LOPRESSOR) 25 MG tablet  acetaminophen (TYLENOL) 325 MG tablet  acetaminophen (TYLENOL) 500 MG tablet  amiodarone (PACERONE) 200 MG tablet  clobetasol (TEMOVATE) 0.05 % cream  gabapentin (NEURONTIN) 100 MG capsule  gabapentin (NEURONTIN) 300 MG capsule  HEMP OIL OR EXTRACT OR OTHER CBD CANNABINOID, NOT MEDICAL CANNABIS,  hydrocortisone 1 % cream  levonorgestrel (MIRENA) 20 mcg/24 hr (5 years) IUD  Magnesium Oxide 250 MG TABS  Melatonin 10 MG CAPS  Melatonin 5 MG CHEW  metFORMIN (GLUCOPHAGE-XR) 500 MG 24 hr tablet  ondansetron (ZOFRAN) 4 MG tablet  tiZANidine (ZANAFLEX) 4 MG tablet  warfarin (COUMADIN/JANTOVEN) 5 MG tablet        ALLERGIES:  Allergies   Allergen Reactions     Apixaban Other (See Comments)     Other reaction(s): menorrhagia  Menorrhagia       Prednisone Palpitations       FAMILY HISTORY:  Family History   Problem Relation Age of Onset     Diabetes Father      CABG Father 50.00     Atrial fibrillation Father      Sleep Apnea Father      Skin Cancer Father      Diabetes Maternal Aunt      No Known Problems Mother      No Known Problems Son        SOCIAL HISTORY:   Social History     Socioeconomic History     Marital status:      Number of children: 2   Tobacco Use     Smoking status: Former Smoker     Types: Cigarettes, Cigarettes     Smokeless tobacco: Never Used   Substance and Sexual Activity     Alcohol use: No     Drug use: No     Sexual activity: Yes     Partners: Male     Birth control/protection: Implant       VITALS:  /81   Pulse (!) 121   Temp 98.4  F (36.9  C) (Oral)   Resp 15   Ht 1.727 m (5' 8\")   Wt 133.4 kg (294 lb)   SpO2 97%   BMI 44.70 kg/m      PHYSICAL EXAM      Vitals: /81   Pulse (!) 121   Temp 98.4  F (36.9  C) (Oral)   Resp 15   Ht 1.727 m (5' 8\")   Wt 133.4 kg (294 lb)   SpO2 97%   BMI 44.70 kg/m    General: Appears in no acute distress, awake, alert, interactive. Wearing headphones and sunglasses.   Eyes: Conjunctivae non-injected. " Sclera anicteric.  HENT: Atraumatic.  Neck: Supple.  Respiratory/Chest: Respiration unlabored. Irregular regular rhythm. No murmurs.  Abdomen: non distended  Musculoskeletal: Normal extremities. No edema or erythema.  Skin: Normal color. No rash or diaphoresis.  Neurologic: Face symmetric, moves all extremities spontaneously. Speech clear.  Psychiatric: Oriented to person, place, and time. Affect appropriate.       LAB:  All pertinent labs reviewed and interpreted.  Results for orders placed or performed during the hospital encounter of 08/26/22   XR Chest Port 1 View    Impression    IMPRESSION: Slightly better inspiration. Cardiomegaly is unchanged. Interval resolution of the mild interstitial edema with less pulmonary vascular congestion. No effusions.   Extra Blue Top Tube   Result Value Ref Range    Hold Specimen JIC    Extra Red Top Tube   Result Value Ref Range    Hold Specimen JIC    Extra Green Top (Lithium Heparin) Tube   Result Value Ref Range    Hold Specimen JIC    Extra Purple Top Tube   Result Value Ref Range    Hold Specimen JIC    CBC (+ platelets, no diff)   Result Value Ref Range    WBC Count 6.7 4.0 - 11.0 10e3/uL    RBC Count 4.61 3.80 - 5.20 10e6/uL    Hemoglobin 12.3 11.7 - 15.7 g/dL    Hematocrit 38.5 35.0 - 47.0 %    MCV 84 78 - 100 fL    MCH 26.7 26.5 - 33.0 pg    MCHC 31.9 31.5 - 36.5 g/dL    RDW 14.6 10.0 - 15.0 %    Platelet Count 274 150 - 450 10e3/uL   Comprehensive metabolic panel   Result Value Ref Range    Sodium 139 136 - 145 mmol/L    Potassium 4.2 3.5 - 5.0 mmol/L    Chloride 103 98 - 107 mmol/L    Carbon Dioxide (CO2) 25 22 - 31 mmol/L    Anion Gap 11 5 - 18 mmol/L    Urea Nitrogen 11 8 - 22 mg/dL    Creatinine 0.81 0.60 - 1.10 mg/dL    Calcium 9.6 8.5 - 10.5 mg/dL    Glucose 112 70 - 125 mg/dL    Alkaline Phosphatase 87 45 - 120 U/L    AST 26 0 - 40 U/L    ALT 19 0 - 45 U/L    Protein Total 7.8 6.0 - 8.0 g/dL    Albumin 4.0 3.5 - 5.0 g/dL    Bilirubin Total 0.7 0.0 - 1.0 mg/dL     GFR Estimate >90 >60 mL/min/1.73m2   Result Value Ref Range    INR 2.94 (H) 0.85 - 1.15   Troponin I (now)   Result Value Ref Range    Troponin I 0.02 0.00 - 0.29 ng/mL   Result Value Ref Range    Magnesium 1.9 1.8 - 2.6 mg/dL   B-Type Natriuretic Peptide ( East Only)   Result Value Ref Range     (H) 0 - 64 pg/mL   ECG 12-LEAD WITH MUSE (E)   Result Value Ref Range    Systolic Blood Pressure  mmHg    Diastolic Blood Pressure  mmHg    Ventricular Rate 113 BPM    Atrial Rate 278 BPM    IL Interval  ms    QRS Duration 84 ms     ms    QTc 477 ms    P Axis  degrees    R AXIS 31 degrees    T Axis 3 degrees    Interpretation ECG       Atrial flutter with variable A-V block  Abnormal ECG  When compared with ECG of 04-JUL-2022 21:28,  Atrial flutter has replaced Atrial fibrillation  Confirmed by SEE ED PROVIDER NOTE FOR, ECG INTERPRETATION ([id number]),  Charles Adhikari ([id number]) on 8/26/2022 12:05:32 PM         RADIOLOGY:  Reviewed all pertinent imaging. Please see official radiology report.  XR Chest Port 1 View   Final Result   IMPRESSION: Slightly better inspiration. Cardiomegaly is unchanged. Interval resolution of the mild interstitial edema with less pulmonary vascular congestion. No effusions.          EKG:    Performed at: 26-AUG-2022 11:52:59    Impression: Abnormal ECG    Rate: 113 bpm  Rhythm: Atrial flutter with variable A-V block  Axis: 31  IL Interval: * ms  QRS Interval: 84 ms  QTc Interval: 477 ms   ST Changes: No ST changes  Comparison: When compared with ECG of 04-JUL-2022 21:28, Atrial flutter has replaced Atrial fibrillation    I have independently reviewed and interpreted the EKG(s) documented above.    PROCEDURES:   none      I, Haider Saucedo, am serving as a scribe to document services personally performed by Nikita Moore MD based on my observation and the provider's statements to me. I, Dr. Nikita Moore, attest that Haider Saucedo is acting in a scribe capacity, has observed my performance  of the services and has documented them in accordance with my direction.    Nikita Moore MD  Emergency Medicine  [facility] EMERGENCY ROOM  [address] [facility] [address] [id number] [phone]     Nikita Moore MD  08/26/22 1640